# Patient Record
Sex: FEMALE | Race: WHITE | Employment: UNEMPLOYED | URBAN - METROPOLITAN AREA
[De-identification: names, ages, dates, MRNs, and addresses within clinical notes are randomized per-mention and may not be internally consistent; named-entity substitution may affect disease eponyms.]

---

## 2017-06-13 DIAGNOSIS — M25.561 RIGHT KNEE PAIN, UNSPECIFIED CHRONICITY: Primary | ICD-10-CM

## 2018-02-08 ENCOUNTER — HOSPITAL ENCOUNTER (OUTPATIENT)
Dept: GENERAL RADIOLOGY | Age: 38
Discharge: HOME | End: 2018-02-10
Payer: MEDICARE

## 2018-02-08 ENCOUNTER — OFFICE VISIT (OUTPATIENT)
Dept: FAMILY MEDICINE CLINIC | Age: 38
End: 2018-02-08
Payer: MEDICARE

## 2018-02-08 ENCOUNTER — HOSPITAL ENCOUNTER (OUTPATIENT)
Age: 38
Discharge: HOME | End: 2018-02-10
Payer: MEDICARE

## 2018-02-08 VITALS
WEIGHT: 194 LBS | SYSTOLIC BLOOD PRESSURE: 102 MMHG | TEMPERATURE: 96.8 F | BODY MASS INDEX: 32.32 KG/M2 | HEART RATE: 65 BPM | HEIGHT: 65 IN | DIASTOLIC BLOOD PRESSURE: 67 MMHG

## 2018-02-08 DIAGNOSIS — G89.29 CHRONIC PAIN OF BOTH KNEES: Primary | ICD-10-CM

## 2018-02-08 DIAGNOSIS — R52 PAIN: ICD-10-CM

## 2018-02-08 DIAGNOSIS — M54.50 CHRONIC MIDLINE LOW BACK PAIN WITHOUT SCIATICA: ICD-10-CM

## 2018-02-08 DIAGNOSIS — G89.29 CHRONIC MIDLINE LOW BACK PAIN WITHOUT SCIATICA: ICD-10-CM

## 2018-02-08 DIAGNOSIS — G89.29 CHRONIC PAIN OF BOTH KNEES: ICD-10-CM

## 2018-02-08 DIAGNOSIS — M25.561 CHRONIC PAIN OF BOTH KNEES: Primary | ICD-10-CM

## 2018-02-08 DIAGNOSIS — M25.561 CHRONIC PAIN OF BOTH KNEES: ICD-10-CM

## 2018-02-08 DIAGNOSIS — M25.562 CHRONIC PAIN OF BOTH KNEES: Primary | ICD-10-CM

## 2018-02-08 DIAGNOSIS — M25.562 CHRONIC PAIN OF BOTH KNEES: ICD-10-CM

## 2018-02-08 PROCEDURE — G8484 FLU IMMUNIZE NO ADMIN: HCPCS | Performed by: STUDENT IN AN ORGANIZED HEALTH CARE EDUCATION/TRAINING PROGRAM

## 2018-02-08 PROCEDURE — 73560 X-RAY EXAM OF KNEE 1 OR 2: CPT

## 2018-02-08 PROCEDURE — 4004F PT TOBACCO SCREEN RCVD TLK: CPT | Performed by: STUDENT IN AN ORGANIZED HEALTH CARE EDUCATION/TRAINING PROGRAM

## 2018-02-08 PROCEDURE — G8417 CALC BMI ABV UP PARAM F/U: HCPCS | Performed by: STUDENT IN AN ORGANIZED HEALTH CARE EDUCATION/TRAINING PROGRAM

## 2018-02-08 PROCEDURE — G8427 DOCREV CUR MEDS BY ELIG CLIN: HCPCS | Performed by: STUDENT IN AN ORGANIZED HEALTH CARE EDUCATION/TRAINING PROGRAM

## 2018-02-08 PROCEDURE — 99203 OFFICE O/P NEW LOW 30 MIN: CPT | Performed by: STUDENT IN AN ORGANIZED HEALTH CARE EDUCATION/TRAINING PROGRAM

## 2018-02-08 PROCEDURE — 72110 X-RAY EXAM L-2 SPINE 4/>VWS: CPT

## 2018-02-08 RX ORDER — NAPROXEN 500 MG/1
500 TABLET ORAL 2 TIMES DAILY WITH MEALS
Qty: 60 TABLET | Refills: 1 | Status: SHIPPED | OUTPATIENT
Start: 2018-02-08 | End: 2018-11-10

## 2018-02-08 ASSESSMENT — ENCOUNTER SYMPTOMS
COUGH: 0
BACK PAIN: 1
EYE PAIN: 0
RHINORRHEA: 0
WHEEZING: 0
NAUSEA: 0
PHOTOPHOBIA: 0
ABDOMINAL PAIN: 0
SHORTNESS OF BREATH: 0
VOMITING: 0
SORE THROAT: 0

## 2018-04-19 DIAGNOSIS — M25.562 PAIN IN BOTH KNEES, UNSPECIFIED CHRONICITY: Primary | ICD-10-CM

## 2018-04-19 DIAGNOSIS — M25.561 PAIN IN BOTH KNEES, UNSPECIFIED CHRONICITY: Primary | ICD-10-CM

## 2018-08-13 ENCOUNTER — HOSPITAL ENCOUNTER (OUTPATIENT)
Age: 38
Setting detail: SPECIMEN
Discharge: HOME | End: 2018-08-13
Payer: MEDICARE

## 2018-08-13 LAB
ABSOLUTE EOS #: 0.06 K/UL (ref 0–0.44)
ABSOLUTE IMMATURE GRANULOCYTE: <0.03 K/UL (ref 0–0.3)
ABSOLUTE LYMPH #: 2.2 K/UL (ref 1.1–3.7)
ABSOLUTE MONO #: 0.39 K/UL (ref 0.1–1.2)
ALBUMIN SERPL-MCNC: 4 G/DL (ref 3.5–5.2)
ALBUMIN/GLOBULIN RATIO: 1.3 (ref 1–2.5)
ALP BLD-CCNC: 76 U/L (ref 35–104)
ALT SERPL-CCNC: 13 U/L (ref 5–33)
ANION GAP SERPL CALCULATED.3IONS-SCNC: 17 MMOL/L (ref 9–17)
AST SERPL-CCNC: 13 U/L
BASOPHILS # BLD: 1 % (ref 0–2)
BASOPHILS ABSOLUTE: 0.04 K/UL (ref 0–0.2)
BILIRUB SERPL-MCNC: 0.54 MG/DL (ref 0.3–1.2)
BILIRUBIN DIRECT: 0.1 MG/DL
BILIRUBIN, INDIRECT: 0.44 MG/DL (ref 0–1)
BUN BLDV-MCNC: 18 MG/DL (ref 6–20)
CALCIUM SERPL-MCNC: 9.4 MG/DL (ref 8.6–10.4)
CHLORIDE BLD-SCNC: 97 MMOL/L (ref 98–107)
CHOLESTEROL/HDL RATIO: 2.9
CHOLESTEROL: 189 MG/DL
CO2: 26 MMOL/L (ref 20–31)
CREAT SERPL-MCNC: 0.68 MG/DL (ref 0.5–0.9)
DIFFERENTIAL TYPE: NORMAL
EOSINOPHILS RELATIVE PERCENT: 1 % (ref 1–4)
GFR AFRICAN AMERICAN: >60 ML/MIN
GFR NON-AFRICAN AMERICAN: >60 ML/MIN
GFR SERPL CREATININE-BSD FRML MDRD: ABNORMAL ML/MIN/{1.73_M2}
GFR SERPL CREATININE-BSD FRML MDRD: ABNORMAL ML/MIN/{1.73_M2}
GLUCOSE BLD-MCNC: 108 MG/DL (ref 70–99)
HAV IGM SER IA-ACNC: NONREACTIVE
HCG QUALITATIVE: NEGATIVE
HCT VFR BLD CALC: 39.7 % (ref 36.3–47.1)
HDLC SERPL-MCNC: 65 MG/DL
HEMOGLOBIN: 12.5 G/DL (ref 11.9–15.1)
HEPATITIS B CORE IGM ANTIBODY: NONREACTIVE
HEPATITIS B SURFACE ANTIGEN: NONREACTIVE
HEPATITIS C ANTIBODY: NONREACTIVE
HIV AG/AB: NONREACTIVE
IMMATURE GRANULOCYTES: 0 %
LDL CHOLESTEROL: 108 MG/DL (ref 0–130)
LYMPHOCYTES # BLD: 37 % (ref 24–43)
MCH RBC QN AUTO: 28 PG (ref 25.2–33.5)
MCHC RBC AUTO-ENTMCNC: 31.5 G/DL (ref 28.4–34.8)
MCV RBC AUTO: 88.8 FL (ref 82.6–102.9)
MONOCYTES # BLD: 7 % (ref 3–12)
NRBC AUTOMATED: 0 PER 100 WBC
PDW BLD-RTO: 14.4 % (ref 11.8–14.4)
PLATELET # BLD: 312 K/UL (ref 138–453)
PLATELET ESTIMATE: NORMAL
PMV BLD AUTO: 9.7 FL (ref 8.1–13.5)
POTASSIUM SERPL-SCNC: 4 MMOL/L (ref 3.7–5.3)
RBC # BLD: 4.47 M/UL (ref 3.95–5.11)
RBC # BLD: NORMAL 10*6/UL
SEG NEUTROPHILS: 54 % (ref 36–65)
SEGMENTED NEUTROPHILS ABSOLUTE COUNT: 3.25 K/UL (ref 1.5–8.1)
SODIUM BLD-SCNC: 140 MMOL/L (ref 135–144)
T3 FREE: 2.63 PG/ML (ref 2.02–4.43)
THYROXINE, FREE: 1.35 NG/DL (ref 0.93–1.7)
TOTAL PROTEIN: 7.2 G/DL (ref 6.4–8.3)
TRIGL SERPL-MCNC: 78 MG/DL
TSH SERPL DL<=0.05 MIU/L-ACNC: 0.33 MIU/L (ref 0.3–5)
VLDLC SERPL CALC-MCNC: NORMAL MG/DL (ref 1–30)
WBC # BLD: 6 K/UL (ref 3.5–11.3)
WBC # BLD: NORMAL 10*3/UL

## 2018-08-15 LAB
QUANTI TB GOLD PLUS: NEGATIVE
QUANTI TB1 MINUS NIL: 0 IU/ML (ref 0–0.34)
QUANTI TB2 MINUS NIL: 0.01 IU/ML (ref 0–0.34)
QUANTIFERON MITOGEN: 4.96 IU/ML
QUANTIFERON NIL: 0.08 IU/ML

## 2018-11-10 ENCOUNTER — HOSPITAL ENCOUNTER (EMERGENCY)
Age: 38
Discharge: HOME | End: 2018-11-10
Attending: EMERGENCY MEDICINE
Payer: MEDICARE

## 2018-11-10 VITALS
DIASTOLIC BLOOD PRESSURE: 53 MMHG | WEIGHT: 180 LBS | OXYGEN SATURATION: 99 % | SYSTOLIC BLOOD PRESSURE: 124 MMHG | HEART RATE: 45 BPM | RESPIRATION RATE: 18 BRPM | TEMPERATURE: 98.6 F | HEIGHT: 65 IN | BODY MASS INDEX: 29.99 KG/M2

## 2018-11-10 DIAGNOSIS — F11.93 HEROIN WITHDRAWAL (HCC): Primary | ICD-10-CM

## 2018-11-10 LAB
ABSOLUTE EOS #: <0.03 K/UL (ref 0–0.44)
ABSOLUTE IMMATURE GRANULOCYTE: <0.03 K/UL (ref 0–0.3)
ABSOLUTE LYMPH #: 2.64 K/UL (ref 1.1–3.7)
ABSOLUTE MONO #: 0.65 K/UL (ref 0.1–1.2)
ANION GAP SERPL CALCULATED.3IONS-SCNC: 13 MMOL/L (ref 9–17)
BASOPHILS # BLD: 1 % (ref 0–2)
BASOPHILS ABSOLUTE: 0.04 K/UL (ref 0–0.2)
BUN BLDV-MCNC: 13 MG/DL (ref 6–20)
BUN/CREAT BLD: ABNORMAL (ref 9–20)
CALCIUM SERPL-MCNC: 9.7 MG/DL (ref 8.6–10.4)
CHLORIDE BLD-SCNC: 93 MMOL/L (ref 98–107)
CO2: 28 MMOL/L (ref 20–31)
CREAT SERPL-MCNC: 0.73 MG/DL (ref 0.5–0.9)
DIFFERENTIAL TYPE: ABNORMAL
EOSINOPHILS RELATIVE PERCENT: 0 % (ref 1–4)
GFR AFRICAN AMERICAN: >60 ML/MIN
GFR NON-AFRICAN AMERICAN: >60 ML/MIN
GFR SERPL CREATININE-BSD FRML MDRD: ABNORMAL ML/MIN/{1.73_M2}
GFR SERPL CREATININE-BSD FRML MDRD: ABNORMAL ML/MIN/{1.73_M2}
GLUCOSE BLD-MCNC: 105 MG/DL (ref 70–99)
HCG QUALITATIVE: NEGATIVE
HCT VFR BLD CALC: 43.5 % (ref 36.3–47.1)
HEMOGLOBIN: 14 G/DL (ref 11.9–15.1)
IMMATURE GRANULOCYTES: 0 %
LYMPHOCYTES # BLD: 35 % (ref 24–43)
MCH RBC QN AUTO: 26.9 PG (ref 25.2–33.5)
MCHC RBC AUTO-ENTMCNC: 32.2 G/DL (ref 28.4–34.8)
MCV RBC AUTO: 83.5 FL (ref 82.6–102.9)
MONOCYTES # BLD: 9 % (ref 3–12)
NRBC AUTOMATED: 0 PER 100 WBC
PDW BLD-RTO: 13.9 % (ref 11.8–14.4)
PLATELET # BLD: 341 K/UL (ref 138–453)
PLATELET ESTIMATE: ABNORMAL
PMV BLD AUTO: 9.3 FL (ref 8.1–13.5)
POTASSIUM SERPL-SCNC: 3.7 MMOL/L (ref 3.7–5.3)
RBC # BLD: 5.21 M/UL (ref 3.95–5.11)
RBC # BLD: ABNORMAL 10*6/UL
SEG NEUTROPHILS: 55 % (ref 36–65)
SEGMENTED NEUTROPHILS ABSOLUTE COUNT: 4.3 K/UL (ref 1.5–8.1)
SODIUM BLD-SCNC: 134 MMOL/L (ref 135–144)
WBC # BLD: 7.7 K/UL (ref 3.5–11.3)
WBC # BLD: ABNORMAL 10*3/UL

## 2018-11-10 PROCEDURE — 99283 EMERGENCY DEPT VISIT LOW MDM: CPT

## 2018-11-10 PROCEDURE — 6360000002 HC RX W HCPCS: Performed by: EMERGENCY MEDICINE

## 2018-11-10 PROCEDURE — 96372 THER/PROPH/DIAG INJ SC/IM: CPT

## 2018-11-10 PROCEDURE — 2580000003 HC RX 258: Performed by: EMERGENCY MEDICINE

## 2018-11-10 PROCEDURE — 84703 CHORIONIC GONADOTROPIN ASSAY: CPT

## 2018-11-10 PROCEDURE — 96375 TX/PRO/DX INJ NEW DRUG ADDON: CPT

## 2018-11-10 PROCEDURE — 96361 HYDRATE IV INFUSION ADD-ON: CPT

## 2018-11-10 PROCEDURE — 80048 BASIC METABOLIC PNL TOTAL CA: CPT

## 2018-11-10 PROCEDURE — 96374 THER/PROPH/DIAG INJ IV PUSH: CPT

## 2018-11-10 PROCEDURE — 85025 COMPLETE CBC W/AUTO DIFF WBC: CPT

## 2018-11-10 RX ORDER — KETOROLAC TROMETHAMINE 15 MG/ML
15 INJECTION, SOLUTION INTRAMUSCULAR; INTRAVENOUS ONCE
Status: COMPLETED | OUTPATIENT
Start: 2018-11-10 | End: 2018-11-10

## 2018-11-10 RX ORDER — 0.9 % SODIUM CHLORIDE 0.9 %
1000 INTRAVENOUS SOLUTION INTRAVENOUS ONCE
Status: COMPLETED | OUTPATIENT
Start: 2018-11-10 | End: 2018-11-10

## 2018-11-10 RX ORDER — PROMETHAZINE HYDROCHLORIDE 25 MG/1
25 TABLET ORAL EVERY 6 HOURS PRN
Qty: 20 TABLET | Refills: 0 | Status: SHIPPED | OUTPATIENT
Start: 2018-11-10 | End: 2018-11-17

## 2018-11-10 RX ORDER — LORAZEPAM 2 MG/ML
0.5 INJECTION INTRAMUSCULAR ONCE
Status: COMPLETED | OUTPATIENT
Start: 2018-11-10 | End: 2018-11-10

## 2018-11-10 RX ORDER — HYDROXYZINE HYDROCHLORIDE 50 MG/ML
50 INJECTION, SOLUTION INTRAMUSCULAR ONCE
Status: COMPLETED | OUTPATIENT
Start: 2018-11-10 | End: 2018-11-10

## 2018-11-10 RX ORDER — ONDANSETRON 2 MG/ML
4 INJECTION INTRAMUSCULAR; INTRAVENOUS ONCE
Status: COMPLETED | OUTPATIENT
Start: 2018-11-10 | End: 2018-11-10

## 2018-11-10 RX ADMIN — HYDROXYZINE HYDROCHLORIDE 50 MG: 50 INJECTION, SOLUTION INTRAMUSCULAR at 05:50

## 2018-11-10 RX ADMIN — SODIUM CHLORIDE 1000 ML: 9 INJECTION, SOLUTION INTRAVENOUS at 06:20

## 2018-11-10 RX ADMIN — KETOROLAC TROMETHAMINE 15 MG: 15 INJECTION INTRAMUSCULAR; INTRAVENOUS at 05:50

## 2018-11-10 RX ADMIN — LORAZEPAM 0.5 MG: 2 INJECTION INTRAMUSCULAR; INTRAVENOUS at 07:03

## 2018-11-10 RX ADMIN — ONDANSETRON 4 MG: 2 INJECTION INTRAMUSCULAR; INTRAVENOUS at 05:50

## 2018-11-10 RX ADMIN — SODIUM CHLORIDE 1000 ML: 9 INJECTION, SOLUTION INTRAVENOUS at 05:50

## 2018-11-10 ASSESSMENT — PAIN DESCRIPTION - DESCRIPTORS: DESCRIPTORS: ACHING

## 2018-11-10 ASSESSMENT — PAIN DESCRIPTION - PAIN TYPE: TYPE: ACUTE PAIN

## 2018-11-10 ASSESSMENT — PAIN SCALES - GENERAL: PAINLEVEL_OUTOF10: 10

## 2018-11-10 NOTE — ED PROVIDER NOTES
[facility] [facility]     Emergency Department     Faculty Attestation    I performed a history and physical examination of the patient and discussed management with the resident. I reviewed the residents note and agree with the documented findings and plan of care. Any areas of disagreement are noted on the chart. I was personally present for the key portions of any procedures. I have documented in the chart those procedures where I was not present during the key portions. I have reviewed the emergency nurses triage note. I agree with the chief complaint, past medical history, past surgical history, allergies, medications, social and family history as documented unless otherwise noted below. For Physician Assistant/ Nurse Practitioner cases/documentation I have personally evaluated this patient and have completed at least one if not all key elements of the E/M (history, physical exam, and MDM). Additional findings are as noted. Patient here with vomiting diarrhea crampy bowel pain for the last 4 days. Patient entered rehab for heroin abuse the day before, is on Subutex, states cannot sleep as well. No headache. AB and soft no focal tenderness rebound or guarding.   We'll treat symptoms, plan to discharge      Critical Care     None    Alexsander Castrejon MD, Zain Serna  Attending Emergency  Physician             Alexsander Castrejon MD  11/10/18 4891

## 2018-11-10 NOTE — ED PROVIDER NOTES
Panel    Urinalysis Reflex to Culture    HCG Qualitative, Serum    Insert peripheral IV       MEDICATIONS ORDERED:  Orders Placed This Encounter   Medications    0.9 % sodium chloride bolus    ondansetron (ZOFRAN) injection 4 mg    ketorolac (TORADOL) injection 15 mg    hydrOXYzine (VISTARIL) injection 50 mg    0.9 % sodium chloride bolus    LORazepam (ATIVAN) injection 0.5 mg    promethazine (PHENERGAN) 25 MG tablet     Sig: Take 1 tablet by mouth every 6 hours as needed for Nausea     Dispense:  20 tablet     Refill:  0       DDX: withdrawal symptoms, dehydration, DKA    DIAGNOSTIC RESULTS / EMERGENCY DEPARTMENT COURSE / MDM     LABS:  Results for orders placed or performed during the hospital encounter of 11/10/18   CBC WITH AUTO DIFFERENTIAL   Result Value Ref Range    WBC 7.7 3.5 - 11.3 k/uL    RBC 5.21 (H) 3.95 - 5.11 m/uL    Hemoglobin 14.0 11.9 - 15.1 g/dL    Hematocrit 43.5 36.3 - 47.1 %    MCV 83.5 82.6 - 102.9 fL    MCH 26.9 25.2 - 33.5 pg    MCHC 32.2 28.4 - 34.8 g/dL    RDW 13.9 11.8 - 14.4 %    Platelets 780 666 - 856 k/uL    MPV 9.3 8.1 - 13.5 fL    NRBC Automated 0.0 0.0 per 100 WBC    Differential Type NOT REPORTED     Seg Neutrophils 55 36 - 65 %    Lymphocytes 35 24 - 43 %    Monocytes 9 3 - 12 %    Eosinophils % 0 (L) 1 - 4 %    Basophils 1 0 - 2 %    Immature Granulocytes 0 0 %    Segs Absolute 4.30 1.50 - 8.10 k/uL    Absolute Lymph # 2.64 1.10 - 3.70 k/uL    Absolute Mono # 0.65 0.10 - 1.20 k/uL    Absolute Eos # <0.03 0.00 - 0.44 k/uL    Basophils # 0.04 0.00 - 0.20 k/uL    Absolute Immature Granulocyte <0.03 0.00 - 0.30 k/uL    WBC Morphology NOT REPORTED     RBC Morphology NOT REPORTED     Platelet Estimate NOT REPORTED    Basic Metabolic Panel   Result Value Ref Range    Glucose 105 (H) 70 - 99 mg/dL    BUN 13 6 - 20 mg/dL    CREATININE 0.73 0.50 - 0.90 mg/dL    Bun/Cre Ratio NOT REPORTED 9 - 20    Calcium 9.7 8.6 - 10.4 mg/dL    Sodium 134 (L) 135 - 144 mmol/L    Potassium 3.7 3.7 - 5.3 mmol/L    Chloride 93 (L) 98 - 107 mmol/L    CO2 28 20 - 31 mmol/L    Anion Gap 13 9 - 17 mmol/L    GFR Non-African American >60 >60 mL/min    GFR African American >60 >60 mL/min    GFR Comment          GFR Staging NOT REPORTED    HCG Qualitative, Serum   Result Value Ref Range    hCG Qual NEGATIVE NEG       IMPRESSION: 65-year-old female presents with symptoms of generalized malaise, body aches, vomiting. Likely going through acute withdrawal.  Last time she used heroin was Monday. In a program and on Subutex. Patient slightly bradycardic however appears well-hydrated. She does no vomiting. Abdomen soft, nondistended with minimal tenderness. Absolutely no peritoneal.  We'll rehydrate, check labs, symptomatically control and likely discharged to follow-up with [facility] RADIOLOGY:  None    EKG  None    All EKG's are interpreted by the Emergency Department Physician who either signs or Co-signs this chart in the absence of a cardiologist.    EMERGENCY DEPARTMENT COURSE:  Patient feels improved. Vital signs remain normal.  Well-hydrated after 2 L of fluid. We'll discharge home to continue treatment. PROCEDURES:  None    CONSULTS:  None    CRITICAL CARE:  None    FINAL IMPRESSION      1. Heroin withdrawal ([address]          DISPOSITION / PLAN     DISPOSITION  Discharge      PATIENT REFERRED TO:  No follow-up provider specified. DISCHARGE MEDICATIONS:  Discharge Medication List as of 11/10/2018  7:36 AM      START taking these medications    Details   promethazine (PHENERGAN) 25 MG tablet Take 1 tablet by mouth every 6 hours as needed for Nausea, Disp-20 tablet, R-0Print             Kobe Fox DO  Emergency Medicine Resident    (Please note that portions of thisnote were completed with a voice recognition program.  Efforts were made to edit the dictations but occasionally words are mis-transcribed. )        Kobe Fox DO  Resident  11/10/18 [address] [place]

## 2018-11-15 ASSESSMENT — ENCOUNTER SYMPTOMS
CHEST TIGHTNESS: 0
VOMITING: 1
SHORTNESS OF BREATH: 0
ABDOMINAL PAIN: 0
NAUSEA: 1
WHEEZING: 0

## 2018-12-13 ENCOUNTER — HOSPITAL ENCOUNTER (EMERGENCY)
Age: 38
Discharge: HOME | End: 2018-12-13
Attending: EMERGENCY MEDICINE
Payer: MEDICARE

## 2018-12-13 VITALS
SYSTOLIC BLOOD PRESSURE: 124 MMHG | DIASTOLIC BLOOD PRESSURE: 82 MMHG | HEART RATE: 96 BPM | OXYGEN SATURATION: 100 % | TEMPERATURE: 98.2 F | RESPIRATION RATE: 18 BRPM

## 2018-12-13 DIAGNOSIS — T50.904A DRUG OVERDOSE, UNDETERMINED INTENT, INITIAL ENCOUNTER: Primary | ICD-10-CM

## 2018-12-13 PROCEDURE — G0383 LEV 4 HOSP TYPE B ED VISIT: HCPCS

## 2018-12-13 ASSESSMENT — ENCOUNTER SYMPTOMS
DIARRHEA: 0
VOMITING: 0
ABDOMINAL PAIN: 0
SORE THROAT: 0
BACK PAIN: 0
SHORTNESS OF BREATH: 0

## 2018-12-13 ASSESSMENT — PAIN SCALES - GENERAL: PAINLEVEL_OUTOF10: 7

## 2018-12-13 ASSESSMENT — PAIN DESCRIPTION - ORIENTATION: ORIENTATION: LOWER

## 2018-12-13 ASSESSMENT — PAIN DESCRIPTION - LOCATION: LOCATION: BACK

## 2018-12-13 NOTE — ED PROVIDER NOTES
[facility] ED  eMERGENCY dEPARTMENT eNCOUnter   Attending Attestation     Pt Name: Glenna Jackson  MRN: [id number]  [place] 1980  Date of evaluation: 12/13/18       Glenna Jackson is a [address] [facility] y.o. female who presents with Drug Overdose (found in [facility] bathroom unresponsive, 4mg narcan IN, responsive )      History: Patient lives with her overdose. Patient followed at [facility] bathroom after having used heroin. Exam: Heart rate and rhythm are regular abdomen exam.  Lungs are clear to auscultation bilaterally. Abdomen soft, nontender. Patient's well-appearing. Patient states she is feeling tired. We'll let her rest and reevaluate in 2 hours. Plan for discharge. I performed a history and physical examination of the patient and discussed management with the resident. I reviewed the residents note and agree with the documented findings and plan of care. Any areas of disagreement are noted on the chart. I was personally present for the key portions of any procedures. I have documented in the chart those procedures where I was not present during the key portions. I have personally reviewed all images and agree with the resident's interpretation. I have reviewed the emergency nurses triage note. I agree with the chief complaint, past medical history, past surgical history, allergies, medications, social and family history as documented unless otherwise noted below. Documentation of the HPI, Physical Exam and Medical Decision Making performed by medical students or scribes is based on my personal performance of the HPI, PE and MDM. For Phys Assistant/ Nurse Practitioner cases/documentation I have had a face to face evaluation of this patient and have completed at least one if not all key elements of the E/M (history, physical exam, and MDM). Additional findings are as noted.     For APC cases I have personally evaluated and examined the patient in conjunction with the APC and agree with the treatment plan and disposition of the patient as recorded by the APC.     Edward Dunlap MD  Attending Emergency  Physician        Juliet Garcia MD  12/13/18 7370

## 2018-12-13 NOTE — ED PROVIDER NOTES
Constitutional: Positive for fatigue. Negative for chills and fever. HENT: Negative for dental problem and sore throat. Eyes: Negative for visual disturbance. Respiratory: Negative for shortness of breath. Cardiovascular: Negative for chest pain. Gastrointestinal: Negative for abdominal pain, diarrhea and vomiting. Genitourinary: Negative for hematuria. Musculoskeletal: Negative for back pain and neck pain. Skin: Negative for rash. Neurological: Positive for syncope. Negative for headaches. Psychiatric/Behavioral: Negative for confusion. PHYSICAL EXAM   (up to 7 for level 4, 8 or more for level 5)      INITIAL VITALS:   /82   Pulse 96   Temp 98.2 °F (36.8 °C) (Oral)   Resp 18   LMP 12/06/2018   SpO2 100%     Physical Exam   Constitutional: She is oriented to person, place, and time. She appears well-developed and well-nourished. No distress. Fatigued   HENT:   Head: Normocephalic and atraumatic. Mouth/Throat: Oropharynx is clear and moist.   No raccoon eyes, no hilton sign, no hemotympanum   Eyes: Pupils are equal, round, and reactive to light. EOM are normal.   Pupils are 4 and reactive bilaterally   Neck: Normal range of motion. Neck supple. Cardiovascular: Normal rate, regular rhythm, normal heart sounds and intact distal pulses. Pulmonary/Chest: Effort normal and breath sounds normal.   Abdominal: Soft. There is no tenderness. Musculoskeletal: Normal range of motion. She exhibits no deformity. No cervical thoracic or lumbar midline tenderness   Neurological: She is alert and oriented to person, place, and time. She has normal strength. No cranial nerve deficit or sensory deficit. GCS eye subscore is 4. GCS verbal subscore is 5. GCS motor subscore is 6. Skin: Skin is warm and dry. Capillary refill takes less than 2 seconds. She is not diaphoretic. Psychiatric: Thought content normal.   Nursing note and vitals reviewed.       DIFFERENTIAL  DIAGNOSIS     PLAN

## 2018-12-29 ENCOUNTER — HOSPITAL ENCOUNTER (EMERGENCY)
Age: 38
Discharge: HOME | End: 2018-12-30
Attending: EMERGENCY MEDICINE
Payer: MEDICARE

## 2018-12-29 ENCOUNTER — APPOINTMENT (OUTPATIENT)
Dept: GENERAL RADIOLOGY | Age: 38
End: 2018-12-29
Payer: MEDICARE

## 2018-12-29 VITALS
RESPIRATION RATE: 18 BRPM | SYSTOLIC BLOOD PRESSURE: 139 MMHG | TEMPERATURE: 98.4 F | DIASTOLIC BLOOD PRESSURE: 105 MMHG | HEART RATE: 93 BPM | OXYGEN SATURATION: 98 %

## 2018-12-29 DIAGNOSIS — R11.2 NON-INTRACTABLE VOMITING WITH NAUSEA, UNSPECIFIED VOMITING TYPE: Primary | ICD-10-CM

## 2018-12-29 LAB
-: NORMAL
AMORPHOUS: NORMAL
BACTERIA: NORMAL
BILIRUBIN URINE: NEGATIVE
CASTS UA: NORMAL /LPF (ref 0–8)
COLOR: ABNORMAL
COMMENT UA: ABNORMAL
CRYSTALS, UA: NORMAL /HPF
EKG ATRIAL RATE: 89 BPM
EKG P AXIS: 46 DEGREES
EKG P-R INTERVAL: 136 MS
EKG Q-T INTERVAL: 342 MS
EKG QRS DURATION: 76 MS
EKG QTC CALCULATION (BAZETT): 416 MS
EKG R AXIS: 60 DEGREES
EKG T AXIS: 45 DEGREES
EKG VENTRICULAR RATE: 89 BPM
EPITHELIAL CELLS UA: NORMAL /HPF (ref 0–5)
GLUCOSE URINE: NEGATIVE
HCG(URINE) PREGNANCY TEST: NEGATIVE
KETONES, URINE: NEGATIVE
LEUKOCYTE ESTERASE, URINE: NEGATIVE
MUCUS: NORMAL
NITRITE, URINE: NEGATIVE
OTHER OBSERVATIONS UA: NORMAL
PH UA: 6 (ref 5–8)
PROTEIN UA: ABNORMAL
RBC UA: NORMAL /HPF (ref 0–4)
RENAL EPITHELIAL, UA: NORMAL /HPF
SPECIFIC GRAVITY UA: 1.03 (ref 1–1.03)
TRICHOMONAS: NORMAL
TURBIDITY: CLEAR
URINE HGB: ABNORMAL
UROBILINOGEN, URINE: NORMAL
WBC UA: NORMAL /HPF (ref 0–5)
YEAST: NORMAL

## 2018-12-29 PROCEDURE — 6360000002 HC RX W HCPCS: Performed by: STUDENT IN AN ORGANIZED HEALTH CARE EDUCATION/TRAINING PROGRAM

## 2018-12-29 PROCEDURE — 6370000000 HC RX 637 (ALT 250 FOR IP): Performed by: STUDENT IN AN ORGANIZED HEALTH CARE EDUCATION/TRAINING PROGRAM

## 2018-12-29 PROCEDURE — 99284 EMERGENCY DEPT VISIT MOD MDM: CPT

## 2018-12-29 PROCEDURE — 84703 CHORIONIC GONADOTROPIN ASSAY: CPT

## 2018-12-29 PROCEDURE — 93005 ELECTROCARDIOGRAM TRACING: CPT

## 2018-12-29 PROCEDURE — 87086 URINE CULTURE/COLONY COUNT: CPT

## 2018-12-29 PROCEDURE — 81001 URINALYSIS AUTO W/SCOPE: CPT

## 2018-12-29 RX ORDER — ONDANSETRON 4 MG/1
4 TABLET, FILM COATED ORAL ONCE
Status: COMPLETED | OUTPATIENT
Start: 2018-12-29 | End: 2018-12-29

## 2018-12-29 RX ORDER — LORAZEPAM 0.5 MG/1
0.5 TABLET ORAL ONCE
Status: COMPLETED | OUTPATIENT
Start: 2018-12-29 | End: 2018-12-29

## 2018-12-29 RX ORDER — ONDANSETRON 4 MG/1
4 TABLET, FILM COATED ORAL EVERY 8 HOURS PRN
Qty: 9 TABLET | Refills: 0 | Status: SHIPPED | OUTPATIENT
Start: 2018-12-29 | End: 2019-01-01

## 2018-12-29 RX ORDER — BUPRENORPHINE AND NALOXONE 4; 1 MG/1; MG/1
1 FILM, SOLUBLE BUCCAL; SUBLINGUAL DAILY
COMMUNITY

## 2018-12-29 RX ADMIN — ONDANSETRON HYDROCHLORIDE 4 MG: 4 TABLET, FILM COATED ORAL at 23:32

## 2018-12-29 RX ADMIN — LORAZEPAM 0.5 MG: 0.5 TABLET ORAL at 22:37

## 2018-12-29 ASSESSMENT — ENCOUNTER SYMPTOMS
DIARRHEA: 0
ABDOMINAL PAIN: 0
NAUSEA: 1
SORE THROAT: 0
VOMITING: 1
CHEST TIGHTNESS: 0
SHORTNESS OF BREATH: 1

## 2018-12-29 ASSESSMENT — PAIN DESCRIPTION - DESCRIPTORS: DESCRIPTORS: ACHING

## 2018-12-29 ASSESSMENT — PAIN DESCRIPTION - PAIN TYPE: TYPE: ACUTE PAIN

## 2018-12-29 ASSESSMENT — PAIN SCALES - GENERAL: PAINLEVEL_OUTOF10: 8

## 2018-12-29 ASSESSMENT — PAIN DESCRIPTION - LOCATION: LOCATION: BACK

## 2018-12-29 ASSESSMENT — PAIN DESCRIPTION - ORIENTATION: ORIENTATION: MID;LOWER

## 2018-12-31 LAB
CULTURE: NO GROWTH
Lab: NORMAL
SPECIMEN DESCRIPTION: NORMAL
STATUS: NORMAL

## 2020-01-24 ENCOUNTER — HOSPITAL ENCOUNTER (OUTPATIENT)
Age: 40
Discharge: HOME | End: 2020-01-24
Payer: MEDICARE

## 2020-01-24 LAB
ALBUMIN SERPL-MCNC: 3.8 G/DL (ref 3.5–5.2)
ALBUMIN/GLOBULIN RATIO: 1.2 (ref 1–2.5)
ALP BLD-CCNC: 97 U/L (ref 35–104)
ALT SERPL-CCNC: 10 U/L (ref 5–33)
ANION GAP SERPL CALCULATED.3IONS-SCNC: 14 MMOL/L (ref 9–17)
AST SERPL-CCNC: 12 U/L
BILIRUB SERPL-MCNC: 0.55 MG/DL (ref 0.3–1.2)
BUN BLDV-MCNC: 12 MG/DL (ref 6–20)
BUN/CREAT BLD: NORMAL (ref 9–20)
CALCIUM SERPL-MCNC: 8.9 MG/DL (ref 8.6–10.4)
CHLORIDE BLD-SCNC: 101 MMOL/L (ref 98–107)
CO2: 23 MMOL/L (ref 20–31)
CREAT SERPL-MCNC: 0.56 MG/DL (ref 0.5–0.9)
GFR AFRICAN AMERICAN: >60 ML/MIN
GFR NON-AFRICAN AMERICAN: >60 ML/MIN
GFR SERPL CREATININE-BSD FRML MDRD: NORMAL ML/MIN/{1.73_M2}
GFR SERPL CREATININE-BSD FRML MDRD: NORMAL ML/MIN/{1.73_M2}
GLUCOSE BLD-MCNC: 91 MG/DL (ref 70–99)
HAV IGM SER IA-ACNC: NONREACTIVE
HCT VFR BLD CALC: 38 % (ref 36.3–47.1)
HEMOGLOBIN: 12.4 G/DL (ref 11.9–15.1)
HEPATITIS B CORE IGM ANTIBODY: NONREACTIVE
HEPATITIS B SURFACE ANTIGEN: NONREACTIVE
HEPATITIS C ANTIBODY: NONREACTIVE
HIV AG/AB: NONREACTIVE
MCH RBC QN AUTO: 28.6 PG (ref 25.2–33.5)
MCHC RBC AUTO-ENTMCNC: 32.6 G/DL (ref 28.4–34.8)
MCV RBC AUTO: 87.8 FL (ref 82.6–102.9)
NRBC AUTOMATED: 0 PER 100 WBC
PDW BLD-RTO: 14.3 % (ref 11.8–14.4)
PLATELET # BLD: 295 K/UL (ref 138–453)
PMV BLD AUTO: 9.5 FL (ref 8.1–13.5)
POTASSIUM SERPL-SCNC: 4.2 MMOL/L (ref 3.7–5.3)
RBC # BLD: 4.33 M/UL (ref 3.95–5.11)
SODIUM BLD-SCNC: 138 MMOL/L (ref 135–144)
T. PALLIDUM, IGG: NONREACTIVE
TOTAL PROTEIN: 7 G/DL (ref 6.4–8.3)
WBC # BLD: 7.8 K/UL (ref 3.5–11.3)

## 2020-01-24 PROCEDURE — 36415 COLL VENOUS BLD VENIPUNCTURE: CPT

## 2020-01-24 PROCEDURE — 87389 HIV-1 AG W/HIV-1&-2 AB AG IA: CPT

## 2020-01-24 PROCEDURE — 80053 COMPREHEN METABOLIC PANEL: CPT

## 2020-01-24 PROCEDURE — 80074 ACUTE HEPATITIS PANEL: CPT

## 2020-01-24 PROCEDURE — 85027 COMPLETE CBC AUTOMATED: CPT

## 2020-01-24 PROCEDURE — 86780 TREPONEMA PALLIDUM: CPT

## 2020-05-11 ENCOUNTER — HOSPITAL ENCOUNTER (EMERGENCY)
Age: 40
Discharge: HOME | End: 2020-05-12
Attending: EMERGENCY MEDICINE
Payer: MEDICARE

## 2020-05-11 ENCOUNTER — APPOINTMENT (OUTPATIENT)
Dept: CT IMAGING | Age: 40
End: 2020-05-11
Payer: MEDICARE

## 2020-05-11 LAB
-: NORMAL
ABSOLUTE EOS #: 0.12 K/UL (ref 0–0.44)
ABSOLUTE IMMATURE GRANULOCYTE: <0.03 K/UL (ref 0–0.3)
ABSOLUTE LYMPH #: 2.13 K/UL (ref 1.1–3.7)
ABSOLUTE MONO #: 0.43 K/UL (ref 0.1–1.2)
ACETAMINOPHEN LEVEL: <5 UG/ML (ref 10–30)
ALBUMIN SERPL-MCNC: 3.7 G/DL (ref 3.5–5.2)
ALBUMIN/GLOBULIN RATIO: 1.2 (ref 1–2.5)
ALP BLD-CCNC: 97 U/L (ref 35–104)
ALT SERPL-CCNC: 13 U/L (ref 5–33)
AMMONIA: 22 UMOL/L (ref 11–51)
AMORPHOUS: NORMAL
AMPHETAMINE SCREEN URINE: NEGATIVE
ANION GAP SERPL CALCULATED.3IONS-SCNC: 12 MMOL/L (ref 9–17)
AST SERPL-CCNC: 13 U/L
BACTERIA: NORMAL
BARBITURATE SCREEN URINE: NEGATIVE
BASOPHILS # BLD: 1 % (ref 0–2)
BASOPHILS ABSOLUTE: 0.05 K/UL (ref 0–0.2)
BENZODIAZEPINE SCREEN, URINE: POSITIVE
BILIRUB SERPL-MCNC: 0.21 MG/DL (ref 0.3–1.2)
BILIRUBIN URINE: NEGATIVE
BUN BLDV-MCNC: 12 MG/DL (ref 6–20)
BUN/CREAT BLD: ABNORMAL (ref 9–20)
BUPRENORPHINE URINE: ABNORMAL
CALCIUM SERPL-MCNC: 9 MG/DL (ref 8.6–10.4)
CANNABINOID SCREEN URINE: NEGATIVE
CASTS UA: NORMAL /LPF (ref 0–8)
CHLORIDE BLD-SCNC: 105 MMOL/L (ref 98–107)
CO2: 22 MMOL/L (ref 20–31)
COCAINE METABOLITE, URINE: NEGATIVE
COLOR: YELLOW
COMMENT UA: ABNORMAL
CREAT SERPL-MCNC: 0.61 MG/DL (ref 0.5–0.9)
CRYSTALS, UA: NORMAL /HPF
DIFFERENTIAL TYPE: ABNORMAL
EOSINOPHILS RELATIVE PERCENT: 2 % (ref 1–4)
EPITHELIAL CELLS UA: NORMAL /HPF (ref 0–5)
ETHANOL PERCENT: <0.01 %
ETHANOL: <10 MG/DL
GFR AFRICAN AMERICAN: >60 ML/MIN
GFR NON-AFRICAN AMERICAN: >60 ML/MIN
GFR SERPL CREATININE-BSD FRML MDRD: ABNORMAL ML/MIN/{1.73_M2}
GFR SERPL CREATININE-BSD FRML MDRD: ABNORMAL ML/MIN/{1.73_M2}
GLUCOSE BLD-MCNC: 111 MG/DL (ref 70–99)
GLUCOSE URINE: NEGATIVE
HCG QUALITATIVE: NEGATIVE
HCT VFR BLD CALC: 39 % (ref 36.3–47.1)
HEMOGLOBIN: 12.7 G/DL (ref 11.9–15.1)
IMMATURE GRANULOCYTES: 0 %
KETONES, URINE: NEGATIVE
LEUKOCYTE ESTERASE, URINE: ABNORMAL
LYMPHOCYTES # BLD: 35 % (ref 24–43)
MCH RBC QN AUTO: 29.5 PG (ref 25.2–33.5)
MCHC RBC AUTO-ENTMCNC: 32.6 G/DL (ref 28.4–34.8)
MCV RBC AUTO: 90.7 FL (ref 82.6–102.9)
MDMA URINE: ABNORMAL
METHADONE SCREEN, URINE: NEGATIVE
METHAMPHETAMINE, URINE: ABNORMAL
MONOCYTES # BLD: 7 % (ref 3–12)
MUCUS: NORMAL
NITRITE, URINE: NEGATIVE
NRBC AUTOMATED: 0 PER 100 WBC
OPIATES, URINE: POSITIVE
OTHER OBSERVATIONS UA: NORMAL
OXYCODONE SCREEN URINE: NEGATIVE
PDW BLD-RTO: 15.2 % (ref 11.8–14.4)
PH UA: 5.5 (ref 5–8)
PHENCYCLIDINE, URINE: NEGATIVE
PLATELET # BLD: 284 K/UL (ref 138–453)
PLATELET ESTIMATE: ABNORMAL
PMV BLD AUTO: 9.5 FL (ref 8.1–13.5)
POTASSIUM SERPL-SCNC: 3.9 MMOL/L (ref 3.7–5.3)
PROPOXYPHENE, URINE: ABNORMAL
PROTEIN UA: NEGATIVE
RBC # BLD: 4.3 M/UL (ref 3.95–5.11)
RBC # BLD: ABNORMAL 10*6/UL
RBC UA: NORMAL /HPF (ref 0–4)
RENAL EPITHELIAL, UA: NORMAL /HPF
SALICYLATE LEVEL: <1 MG/DL (ref 3–10)
SEG NEUTROPHILS: 55 % (ref 36–65)
SEGMENTED NEUTROPHILS ABSOLUTE COUNT: 3.33 K/UL (ref 1.5–8.1)
SODIUM BLD-SCNC: 139 MMOL/L (ref 135–144)
SPECIFIC GRAVITY UA: 1.01 (ref 1–1.03)
TEST INFORMATION: ABNORMAL
TOTAL PROTEIN: 6.9 G/DL (ref 6.4–8.3)
TOXIC TRICYCLIC SC,BLOOD: NEGATIVE
TRICHOMONAS: NORMAL
TRICYCLIC ANTIDEPRESSANTS, UR: ABNORMAL
TROPONIN INTERP: NORMAL
TROPONIN T: NORMAL NG/ML
TROPONIN, HIGH SENSITIVITY: <6 NG/L (ref 0–14)
TURBIDITY: ABNORMAL
URINE HGB: ABNORMAL
UROBILINOGEN, URINE: NORMAL
WBC # BLD: 6.1 K/UL (ref 3.5–11.3)
WBC # BLD: ABNORMAL 10*3/UL
WBC UA: NORMAL /HPF (ref 0–5)
YEAST: NORMAL

## 2020-05-11 PROCEDURE — 87086 URINE CULTURE/COLONY COUNT: CPT

## 2020-05-11 PROCEDURE — 70450 CT HEAD/BRAIN W/O DYE: CPT

## 2020-05-11 PROCEDURE — 99285 EMERGENCY DEPT VISIT HI MDM: CPT

## 2020-05-11 PROCEDURE — 93005 ELECTROCARDIOGRAM TRACING: CPT | Performed by: STUDENT IN AN ORGANIZED HEALTH CARE EDUCATION/TRAINING PROGRAM

## 2020-05-11 PROCEDURE — 72125 CT NECK SPINE W/O DYE: CPT

## 2020-05-11 PROCEDURE — G0480 DRUG TEST DEF 1-7 CLASSES: HCPCS

## 2020-05-11 PROCEDURE — 82140 ASSAY OF AMMONIA: CPT

## 2020-05-11 PROCEDURE — 84484 ASSAY OF TROPONIN QUANT: CPT

## 2020-05-11 PROCEDURE — 80307 DRUG TEST PRSMV CHEM ANLYZR: CPT

## 2020-05-11 PROCEDURE — 84703 CHORIONIC GONADOTROPIN ASSAY: CPT

## 2020-05-11 PROCEDURE — 81001 URINALYSIS AUTO W/SCOPE: CPT

## 2020-05-11 PROCEDURE — 80053 COMPREHEN METABOLIC PANEL: CPT

## 2020-05-11 PROCEDURE — 85025 COMPLETE CBC W/AUTO DIFF WBC: CPT

## 2020-05-11 ASSESSMENT — PAIN SCALES - GENERAL: PAINLEVEL_OUTOF10: 9
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
## 2020-05-11 NOTE — ED NOTES
Pt rolling around on ED stretcher repeatedly trying to get up. Pt naked and continually ripping off clothes.     Pt assisted back to [facility], RN  05/11/20 8946
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
## 2020-05-12 VITALS
TEMPERATURE: 98.3 F | SYSTOLIC BLOOD PRESSURE: 128 MMHG | RESPIRATION RATE: 20 BRPM | OXYGEN SATURATION: 96 % | HEART RATE: 76 BPM | DIASTOLIC BLOOD PRESSURE: 72 MMHG

## 2020-05-12 LAB
CULTURE: NORMAL
EKG ATRIAL RATE: 114 BPM
EKG P AXIS: 54 DEGREES
EKG P-R INTERVAL: 134 MS
EKG Q-T INTERVAL: 324 MS
EKG QRS DURATION: 76 MS
EKG QTC CALCULATION (BAZETT): 446 MS
EKG R AXIS: 41 DEGREES
EKG T AXIS: 32 DEGREES
EKG VENTRICULAR RATE: 114 BPM
Lab: NORMAL
SPECIMEN DESCRIPTION: NORMAL
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
## 2020-05-12 NOTE — ED NOTES
Pt resting on cot with eyes closed, RR even and unlabored, NAD, call light in reach     Keagan Cook, LULU  05/12/20 3653
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
## 2020-05-12 NOTE — ED NOTES
Pt attempted to get out of bed. Pt is trying to speak but all the words are mumbled. Pt is alert to place and person. Pt states she wants to smoke and feel irritated. RR even and unlabored, NAD, call light in reach.       Josafat Betts RN  05/11/20 9273

## 2020-05-12 NOTE — ED NOTES
Attempted to take pt to CT around 1910. Pt was continually trying to get out of bed and could not get re oriented. At 2015 pt attempted to get out of bed.  updated that pt is still not ready for CT as any stimulation makes pt wake up, confused, and attempts to get back out of bed.       Inderjit Levi RN  05/11/20 2036

## 2020-06-22 ENCOUNTER — NURSE TRIAGE (OUTPATIENT)
Dept: OTHER | Facility: CLINIC | Age: 40
End: 2020-06-22

## 2020-07-25 ENCOUNTER — HOSPITAL ENCOUNTER (OUTPATIENT)
Age: 40
Setting detail: OBSERVATION
Discharge: HOME | End: 2020-07-26
Attending: EMERGENCY MEDICINE | Admitting: EMERGENCY MEDICINE
Payer: MEDICARE

## 2020-07-25 ENCOUNTER — APPOINTMENT (OUTPATIENT)
Dept: GENERAL RADIOLOGY | Age: 40
End: 2020-07-25
Payer: MEDICARE

## 2020-07-25 PROBLEM — M79.671 RIGHT FOOT PAIN: Status: ACTIVE | Noted: 2020-07-25

## 2020-07-25 PROCEDURE — 99285 EMERGENCY DEPT VISIT HI MDM: CPT

## 2020-07-25 PROCEDURE — 73630 X-RAY EXAM OF FOOT: CPT

## 2020-07-25 PROCEDURE — G0378 HOSPITAL OBSERVATION PER HR: HCPCS

## 2020-07-25 PROCEDURE — 96372 THER/PROPH/DIAG INJ SC/IM: CPT

## 2020-07-25 PROCEDURE — 6360000002 HC RX W HCPCS: Performed by: GENERAL PRACTICE

## 2020-07-25 RX ORDER — KETOROLAC TROMETHAMINE 15 MG/ML
15 INJECTION, SOLUTION INTRAMUSCULAR; INTRAVENOUS ONCE
Status: COMPLETED | OUTPATIENT
Start: 2020-07-25 | End: 2020-07-25

## 2020-07-25 RX ADMIN — KETOROLAC TROMETHAMINE 15 MG: 15 INJECTION, SOLUTION INTRAMUSCULAR; INTRAVENOUS at 19:35

## 2020-07-25 ASSESSMENT — ENCOUNTER SYMPTOMS
RESPIRATORY NEGATIVE: 1
COLOR CHANGE: 0
GASTROINTESTINAL NEGATIVE: 1
NAUSEA: 0
VOMITING: 0

## 2020-07-25 ASSESSMENT — PAIN DESCRIPTION - LOCATION: LOCATION: FOOT

## 2020-07-25 ASSESSMENT — PAIN SCALES - GENERAL
PAINLEVEL_OUTOF10: 6
PAINLEVEL_OUTOF10: 6

## 2020-07-25 ASSESSMENT — PAIN DESCRIPTION - ORIENTATION: ORIENTATION: RIGHT

## 2020-07-26 ENCOUNTER — APPOINTMENT (OUTPATIENT)
Dept: MRI IMAGING | Age: 40
End: 2020-07-26
Payer: MEDICARE

## 2020-07-26 VITALS
RESPIRATION RATE: 17 BRPM | HEART RATE: 77 BPM | BODY MASS INDEX: 29.95 KG/M2 | SYSTOLIC BLOOD PRESSURE: 116 MMHG | OXYGEN SATURATION: 98 % | TEMPERATURE: 98.9 F | WEIGHT: 180 LBS | DIASTOLIC BLOOD PRESSURE: 79 MMHG

## 2020-07-26 LAB
ABSOLUTE EOS #: 0.14 K/UL (ref 0–0.44)
ABSOLUTE IMMATURE GRANULOCYTE: <0.03 K/UL (ref 0–0.3)
ABSOLUTE LYMPH #: 2.81 K/UL (ref 1.1–3.7)
ABSOLUTE MONO #: 0.59 K/UL (ref 0.1–1.2)
ANION GAP SERPL CALCULATED.3IONS-SCNC: 11 MMOL/L (ref 9–17)
BASOPHILS # BLD: 1 % (ref 0–2)
BASOPHILS ABSOLUTE: 0.04 K/UL (ref 0–0.2)
BUN BLDV-MCNC: 14 MG/DL (ref 6–20)
BUN/CREAT BLD: NORMAL (ref 9–20)
C-REACTIVE PROTEIN: 4.8 MG/L (ref 0–5)
CALCIUM SERPL-MCNC: 9 MG/DL (ref 8.6–10.4)
CHLORIDE BLD-SCNC: 101 MMOL/L (ref 98–107)
CO2: 26 MMOL/L (ref 20–31)
CREAT SERPL-MCNC: 0.63 MG/DL (ref 0.5–0.9)
DIFFERENTIAL TYPE: ABNORMAL
EOSINOPHILS RELATIVE PERCENT: 2 % (ref 1–4)
GFR AFRICAN AMERICAN: >60 ML/MIN
GFR NON-AFRICAN AMERICAN: >60 ML/MIN
GFR SERPL CREATININE-BSD FRML MDRD: NORMAL ML/MIN/{1.73_M2}
GFR SERPL CREATININE-BSD FRML MDRD: NORMAL ML/MIN/{1.73_M2}
GLUCOSE BLD-MCNC: 95 MG/DL (ref 70–99)
HCT VFR BLD CALC: 37 % (ref 36.3–47.1)
HEMOGLOBIN: 12.1 G/DL (ref 11.9–15.1)
IMMATURE GRANULOCYTES: 0 %
LYMPHOCYTES # BLD: 48 % (ref 24–43)
MCH RBC QN AUTO: 28.9 PG (ref 25.2–33.5)
MCHC RBC AUTO-ENTMCNC: 32.7 G/DL (ref 28.4–34.8)
MCV RBC AUTO: 88.3 FL (ref 82.6–102.9)
MONOCYTES # BLD: 10 % (ref 3–12)
NRBC AUTOMATED: 0 PER 100 WBC
PDW BLD-RTO: 14.3 % (ref 11.8–14.4)
PLATELET # BLD: 314 K/UL (ref 138–453)
PLATELET ESTIMATE: ABNORMAL
PMV BLD AUTO: 9.1 FL (ref 8.1–13.5)
POTASSIUM SERPL-SCNC: 4 MMOL/L (ref 3.7–5.3)
RBC # BLD: 4.19 M/UL (ref 3.95–5.11)
RBC # BLD: ABNORMAL 10*6/UL
SEDIMENTATION RATE, ERYTHROCYTE: 21 MM (ref 0–20)
SEG NEUTROPHILS: 39 % (ref 36–65)
SEGMENTED NEUTROPHILS ABSOLUTE COUNT: 2.27 K/UL (ref 1.5–8.1)
SODIUM BLD-SCNC: 138 MMOL/L (ref 135–144)
WBC # BLD: 5.9 K/UL (ref 3.5–11.3)
WBC # BLD: ABNORMAL 10*3/UL

## 2020-07-26 PROCEDURE — 6360000002 HC RX W HCPCS: Performed by: STUDENT IN AN ORGANIZED HEALTH CARE EDUCATION/TRAINING PROGRAM

## 2020-07-26 PROCEDURE — G0378 HOSPITAL OBSERVATION PER HR: HCPCS

## 2020-07-26 PROCEDURE — 80048 BASIC METABOLIC PNL TOTAL CA: CPT

## 2020-07-26 PROCEDURE — 85025 COMPLETE CBC W/AUTO DIFF WBC: CPT

## 2020-07-26 PROCEDURE — 85652 RBC SED RATE AUTOMATED: CPT

## 2020-07-26 PROCEDURE — 6370000000 HC RX 637 (ALT 250 FOR IP): Performed by: STUDENT IN AN ORGANIZED HEALTH CARE EDUCATION/TRAINING PROGRAM

## 2020-07-26 PROCEDURE — 96372 THER/PROPH/DIAG INJ SC/IM: CPT

## 2020-07-26 PROCEDURE — 86140 C-REACTIVE PROTEIN: CPT

## 2020-07-26 PROCEDURE — 73718 MRI LOWER EXTREMITY W/O DYE: CPT

## 2020-07-26 RX ORDER — CLONIDINE HYDROCHLORIDE 0.2 MG/1
0.2 TABLET ORAL 2 TIMES DAILY
COMMUNITY

## 2020-07-26 RX ORDER — HYDROXYZINE HYDROCHLORIDE 50 MG/ML
25 INJECTION, SOLUTION INTRAMUSCULAR EVERY 8 HOURS PRN
Status: DISCONTINUED | OUTPATIENT
Start: 2020-07-26 | End: 2020-07-26 | Stop reason: HOSPADM

## 2020-07-26 RX ORDER — BUPRENORPHINE HYDROCHLORIDE AND NALOXONE HYDROCHLORIDE DIHYDRATE 8; 2 MG/1; MG/1
1 TABLET SUBLINGUAL 2 TIMES DAILY
Status: DISCONTINUED | OUTPATIENT
Start: 2020-07-26 | End: 2020-07-26 | Stop reason: HOSPADM

## 2020-07-26 RX ORDER — BUPRENORPHINE HYDROCHLORIDE AND NALOXONE HYDROCHLORIDE DIHYDRATE 2; .5 MG/1; MG/1
1 TABLET SUBLINGUAL 2 TIMES DAILY
Status: DISCONTINUED | OUTPATIENT
Start: 2020-07-26 | End: 2020-07-26

## 2020-07-26 RX ORDER — GABAPENTIN 300 MG/1
300 CAPSULE ORAL 3 TIMES DAILY
COMMUNITY

## 2020-07-26 RX ORDER — GABAPENTIN 600 MG/1
300 TABLET ORAL 3 TIMES DAILY
Status: DISCONTINUED | OUTPATIENT
Start: 2020-07-26 | End: 2020-07-26 | Stop reason: HOSPADM

## 2020-07-26 RX ORDER — TRAZODONE HYDROCHLORIDE 150 MG/1
150 TABLET ORAL 2 TIMES DAILY
COMMUNITY

## 2020-07-26 RX ORDER — BUPRENORPHINE HYDROCHLORIDE AND NALOXONE HYDROCHLORIDE DIHYDRATE 2; .5 MG/1; MG/1
2 TABLET SUBLINGUAL DAILY
Status: DISCONTINUED | OUTPATIENT
Start: 2020-07-26 | End: 2020-07-26

## 2020-07-26 RX ORDER — HYDROXYZINE PAMOATE 25 MG/1
25 CAPSULE ORAL 3 TIMES DAILY PRN
COMMUNITY

## 2020-07-26 RX ADMIN — TRAZODONE HYDROCHLORIDE 150 MG: 50 TABLET ORAL at 04:13

## 2020-07-26 RX ADMIN — HYDROXYZINE HYDROCHLORIDE 25 MG: 50 INJECTION, SOLUTION INTRAMUSCULAR at 03:40

## 2020-07-26 RX ADMIN — GABAPENTIN 300 MG: 600 TABLET ORAL at 10:06

## 2020-07-26 RX ADMIN — BUPRENORPHINE AND NALOXONE 1 TABLET: 8; 2 TABLET SUBLINGUAL at 10:06

## 2020-07-26 ASSESSMENT — ENCOUNTER SYMPTOMS
COUGH: 0
ABDOMINAL PAIN: 0
EYE ITCHING: 0
VOMITING: 0
DIARRHEA: 0
TROUBLE SWALLOWING: 0
SHORTNESS OF BREATH: 0
NAUSEA: 0
EYE PAIN: 0
RHINORRHEA: 0
CHEST TIGHTNESS: 0
CHOKING: 0
EYE DISCHARGE: 0

## 2020-07-26 ASSESSMENT — PAIN DESCRIPTION - PAIN TYPE: TYPE: ACUTE PAIN;CHRONIC PAIN

## 2020-07-26 ASSESSMENT — PAIN SCALES - GENERAL
PAINLEVEL_OUTOF10: 6
PAINLEVEL_OUTOF10: 4
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
## 2020-07-26 NOTE — ED NOTES
Bed: 47PED  Expected date:   Expected time:   Means of arrival:   Comments:     Samir Vazquez RN  07/25/20 4947
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
## 2020-07-26 NOTE — DISCHARGE SUMMARY
CDU Discharge Summary        Patient:  Peyton Chavez  YOB: 1980    MRN: [id number]   Acct: [de-identified]    Primary Care Physician: No primary care provider on file. Admit date:  7/25/2020  6:57 PM  Discharge date: 1:43 PM 7/26/2020    Discharge Diagnoses:     1.) Acute right foot pain secondary to avascular necrosis      Follow-up:  Call today/tomorrow for a follow up appointment with No primary care provider on file. , or return to the Emergency Room with worsening symptoms    Stressed to patient the importance of following up with primary care doctor for further workup/management of symptoms. Pt verbalizes understanding and agrees with plan.     Discharge Medication Changes:       Medication List      CONTINUE taking these medications    cloNIDine 0.2 MG tablet  Commonly known as:  CATAPRES     gabapentin 300 MG capsule  Commonly known as:  NEURONTIN     hydrOXYzine 25 MG capsule  Commonly known as:  VISTARIL     Suboxone 4-1 MG Film SL film  Generic drug:  buprenorphine-naloxone     traZODone 150 MG tablet  Commonly known as:  DESYREL            Diet:  DIET GENERAL;, advance as tolerated     Activity:  As tolerated    Consultants: IP CONSULT TO PODIATRY    Procedures:  Not indicated     Diagnostic Test:   Results for orders placed or performed during the hospital encounter of 07/25/20   CBC Auto Differential   Result Value Ref Range    WBC 5.9 3.5 - 11.3 k/uL    RBC 4.19 3.95 - 5.11 m/uL    Hemoglobin 12.1 11.9 - 15.1 g/dL    Hematocrit 37.0 36.3 - 47.1 %    MCV 88.3 82.6 - 102.9 fL    MCH 28.9 25.2 - 33.5 pg    MCHC 32.7 28.4 - 34.8 g/dL    RDW 14.3 11.8 - 14.4 %    Platelets 120 960 - 437 k/uL    MPV 9.1 8.1 - 13.5 fL    NRBC Automated 0.0 0.0 per 100 WBC    Differential Type NOT REPORTED     Seg Neutrophils 39 36 - 65 %    Lymphocytes 48 (H) 24 - 43 %    Monocytes 10 3 - 12 %    Eosinophils % 2 1 - 4 %    Basophils 1 0 - 2 %    Immature Granulocytes 0 0 %    Segs Absolute 2.27 1.50 - 8.10 k/uL    Absolute Lymph # 2.81 1.10 - 3.70 k/uL    Absolute Mono # 0.59 0.10 - 1.20 k/uL    Absolute Eos # 0.14 0.00 - 0.44 k/uL    Basophils Absolute 0.04 0.00 - 0.20 k/uL    Absolute Immature Granulocyte <0.03 0.00 - 0.30 k/uL    WBC Morphology NOT REPORTED     RBC Morphology NOT REPORTED     Platelet Estimate NOT REPORTED    Basic Metabolic Panel   Result Value Ref Range    Glucose 95 70 - 99 mg/dL    BUN 14 6 - 20 mg/dL    CREATININE 0.63 0.50 - 0.90 mg/dL    Bun/Cre Ratio NOT REPORTED 9 - 20    Calcium 9.0 8.6 - 10.4 mg/dL    Sodium 138 135 - 144 mmol/L    Potassium 4.0 3.7 - 5.3 mmol/L    Chloride 101 98 - 107 mmol/L    CO2 26 20 - 31 mmol/L    Anion Gap 11 9 - 17 mmol/L    GFR Non-African American >60 >60 mL/min    GFR African American >60 >60 mL/min    GFR Comment          GFR Staging NOT REPORTED    C-REACTIVE PROTEIN   Result Value Ref Range    CRP 4.8 0.0 - 5.0 mg/L   Sedimentation Rate   Result Value Ref Range    Sed Rate 21 (H) 0 - 20 mm     Xr Foot Right (min 3 Views)    Result Date: 7/25/2020  EXAMINATION: THREE XRAY VIEWS OF THE RIGHT FOOT 7/25/2020 8:19 pm COMPARISON: None. HISTORY: ORDERING SYSTEM PROVIDED HISTORY: stepped on pitch fork 4wks ago, pain and swelling on forefoot TECHNOLOGIST PROVIDED HISTORY: stepped on pitch fork 4wks ago, pain and swelling on forefoot FINDINGS: Erosions 3rd metatarsal phalangeal joint. Cortical margins otherwise intact. His bunion 1st metatarsal head. Alignment anatomic. Soft tissues unremarkable. No subcutaneous gas. Erosions 3rd metatarsal phalangeal joint suspicious for infection. Mri Foot Right Wo Contrast    Result Date: 7/26/2020  EXAMINATION: MRI OF THE RIGHT FOOT WITHOUT CONTRAST, 7/26/2020 9:32 am TECHNIQUE: Multiplanar multisequence MRI of the right foot was performed without the administration of intravenous contrast. COMPARISON: None.   Correlation is made to foot radiograph dated July 25, 2020 HISTORY: ORDERING SYSTEM PROVIDED HISTORY: puncture wound to right foot 1 mo ago, concern for osteo on XR TECHNOLOGIST PROVIDED HISTORY: puncture wound to right foot 1 mo ago, concern for osteo on XR What is the area of interest?->Forefoot Is the patient pregnant?->No FINDINGS: LISFRANC JOINT: Lisfranc alignment is maintained. Lisfranc ligament is intact. BONE MARROW: Abnormal bone marrow signal along the medial margin of the 3rd toe proximal phalanx and 3rd metatarsal head consists of increased signal on the fluid sensitive sequences and decreased signal on the T1 weighted images. GREATER AND LESSER MTP JOINTS: Alignment is anatomic. No appreciable joint effusion. SOFT TISSUES: Subcutaneous edema in the foot dorsum. There is also edema around the 3rd metatarsal diaphysis. Within the limitations of a noncontrast exam, there is no formed fluid collection or abscess. TENDONS: Visible flexor and extensor tendons are grossly intact. 1. Abnormal bone marrow signal along the medial margin of the 3rd toe proximal phalanx and 3rd metatarsal head/distal diaphysis. Given the clinical context, differential includes osteomyelitis; however, periarticular involvement with lack of joint effusion is atypical for an acute osteomyelitis. Please correlate with site of reported puncture wound. Differential includes healing fracture and avascular necrosis. 2. Subcutaneous edema/inflammation of the foot dorsum. 3. Myositis of the intrinsic foot musculature around the 3rd metatarsal.           Physical Exam:    General appearance - NAD, AOx 3   Lungs -CTAB, no R/R/R  Heart - RRR, no M/R/G  Abdomen - Soft, NT/ND  Neurological:  MAEx4, No focal motor deficit, sensory loss  Extremities - Cap refil <2 sec in all ext., Right foot puncture wound on the plantar surface  Skin -warm, dry      Hospital Course:  Clinical course has improved, labs and imaging reviewed.      Marilu Wild originally presented to the hospital on 7/25/2020  6:57 PM with complains of acute right foot pain on the plantar surface. At that time it was determined that She required further observation and Podiatry consult. X-ray imaging of right foot showed erosion of 3rd metatarsal phalangeal joint suspicious for infection. MRI of foot showed abnormal bone marrow signal possible due to avascular necrosis. Patient will be discharged with DME post-op surgical boots and follow up with Podiatry in 2 weeks. Labs and imaging were followed daily. Imaging results as above. She is medically stable to be discharged. Disposition: Home    Patient stated that they will not drive themselves home from the hospital if they have gotten pain killers/ narcotics earlier that day and that they will arrange for transportation on their own or work with the  for a ride. Patient counseled NOT to drive while under the influence of narcotics/ pain killers. Condition: Good    Patient stable and ready for discharge home. I have discussed plan of care with patient and they are in understanding. They were instructed to read discharge paperwork. All of their questions and concerns were addressed. Time Spent: 0 day      --  Judith Roe MD  Emergency Medicine Resident Physician    This dictation was generated by voice recognition computer software. Although all attempts are made to edit the dictation for accuracy, there may be errors in the transcription that are not intended.

## 2020-07-26 NOTE — CARE COORDINATION
Case Management Initial Discharge Plan  Sheyla Owens,             Met with:patient to discuss discharge plans. Information verified: address, contacts, phone number, , insurance Yes    Emergency Contact/Next of Kin name & number: Yue Bain [phone]    PCP: No primary care provider on file. Date of last visit: CLinic list given     Insurance Provider: [employer]     Discharge Planning    Living Arrangements:  Family Members   Support Systems:  Family Members    Home has 1 stories  4 stairs to climb to get into front door    Patient able to perform ADL's:Independent    Current Services (outpatient & in home) none   DME equipment: na  DME provider: na    Receiving oral anticoagulation therapy? No    If indicated:   Physician managing anticoagulation treatment: na  Where does patient obtain lab work for ATC treatment? na      Potential Assistance Needed:  N/A    Patient agreeable to home care: No  [place] of choice provided:  n/a    Prior SNF/Rehab Placement and Facility: none   Agreeable to SNF/Rehab: No  [place] of choice provided: n/a     Evaluation: no    Expected Discharge date:       Patient expects to be discharged to:  Home  Follow Up Appointment: Best Day/ Time:      Transportation provider: Friend or cab   Transportation arrangements needed for discharge: Maybe     Readmission Risk              Risk of Unplanned Readmission:        0             Does patient have a readmission risk score greater than 14?: THEE   If yes, follow-up appointment must be made within 7 days of discharge.      Goals of Care: Get foot better       Discharge Plan: Home with cab ride           Electronically signed by Josué Lira RN on 20 at 2:43 PM EDT

## 2020-07-26 NOTE — ED PROVIDER NOTES
[address] HANDOFF       Handoff taken on the following patient from prior Attending Physician:  Pt Name: Homero Martínez  PCP:  No primary care provider on file. Attestation  I was available and discussed any additional care issues that arose and coordinated the management plans with the resident(s) caring for the patient during my duty period. Any areas of disagreement with resident's documentation of care or procedures are noted on the chart. I was personally present for the key portions of any/all procedures during my duty period. I have documented in the chart those procedures where I was not present during the key portions.              Suzie Martinez MD  07/25/20 [id number]

## 2020-07-26 NOTE — ED NOTES
Attempted to start IV, good blood return, first IV patient refused to let it leave in, verbalizing \"it burns, it hurts, take it out\"  2nd attempt - as soon as IV cathalon inserted patient verbalizes \"OMG, it feels like a cigarette is burning me, take it out!!!\"  Unable to obtain labs or IV       Dony Hanna, LULU  07/25/20 [id number]

## 2020-07-26 NOTE — CONSULTS
Consultation Note  Podiatric Medicine and Surgery     Subjective     Chief Complaint: Right foot pain    HPI:  Earl Bonner is a 35 y.o. female seen at [facility] ED for right foot pain. Patient reports that approximately 1 month ago she stepped on a pitchfork while working in the garden. She reports this went into the bottom of her foot. She had an x-ray done which indicated a small fracture. However, she sought no further treatment. Patient denies receiving any antibiotic therapy at the time of injury. Today, she reinjured the area due to \"stubbing\" her foot at home. Patient is currently residing at [address] [place] [address],[address]. Patient denies history of type 2 diabetes. Patient reports history of chronic IV drug abuse. However, patient is currently in recovery. Patient denies any other pedal complaints at this time. Meek Santana PCP is No primary care provider on file. ROS:   Review of Systems   Constitutional: Negative for chills and fever. Gastrointestinal: Negative for nausea and vomiting. Musculoskeletal: Positive for arthralgias and myalgias. Skin: Negative for color change and wound. Neurological: Negative for dizziness and light-headedness. Psychiatric/Behavioral: The patient is nervous/anxious. Past Medical History   has a past medical history of Anxiety. Past Surgical History   has no past surgical history on file. Medications  Prior to Admission medications    Medication Sig Start Date End Date Taking? Authorizing Provider   buprenorphine-naloxone (SUBOXONE) 4-1 MG FILM SL film Place 1 Film under the tongue daily. Meek Santana Historical Provider, MD    Scheduled Meds:   Tetanus-Diphth-Acell Pertussis  0.5 mL Intramuscular Once     Continuous Infusions:  PRN Meds:. Allergies  has No Known Allergies. Family History  family history is not on file. Social History   reports that she has been smoking cigarettes. She has a 15.00 pack-year smoking history.  She has never used Images:        Imaging:   XR FOOT RIGHT (MIN 3 VIEWS)   Final Result   Erosions 3rd metatarsal phalangeal joint suspicious for infection. MRI FOOT RIGHT WO CONTRAST    (Results Pending)       Cultures: None    Assessment     Anh Riddle is a 35 y.o. female with   1. Osteomyelitis, 3rd metatarsal head, right foot  2. Plantar plate injury, right foot    Active Problems:    Right foot pain  Resolved Problems:    * No resolved hospital problems. *        Plan     · Patient examined and evaluated at bedside   · Treatment options discussed in detail with the patient  · Due to concern for possible osteomyelitis of the 3rd metatarsal head of the right foot, patient to be admitted under observation for MRI of the right foot. MRI ordered without contrast due to inability to get IV access. Patient anxious about admission but after explaining clinical reasoning, patient verbalized understanding and is amenable to admission. · 2nd and 3rd toes of right foot splinted in plantarflexed position with tape. Surgical shoe ordered for the right foot.        · WBAT to Right lower extremity in surgical shoe  · Discussed with Dr. Randall Henry DPM   Podiatric Medicine & Surgery   7/25/2020 at 10:27 PM

## 2020-07-26 NOTE — ED NOTES
Podiatry resident called to notify emergency team that they are okay with discharge and follow up outpatient.       [facility]  07/26/20 7678

## 2020-07-26 NOTE — H&P
[address]  CDU / OBSERVATION ENCOUNTER  RESIDENT NOTE     Pt Name: Carmella Dance  MRN: [id number]  [place] 1980  Date of evaluation: 7/26/20  Patient's PCP is : No primary care provider on file. CHIEF COMPLAINT       Chief Complaint   Patient presents with    Foot Pain         HISTORY OF PRESENT ILLNESS    Carmella Dance is a 35 y.o. female who presented to the ED yesterday with right foot pain, patient states that she stepped on a pitchfork approximately 1 month ago that punctured through her foot. X-ray of right foot demonstrated erosions of 3rd metatarsal phalangeal joint suspicious for infection. Patient was seen by podiatry, and concerns of osteomyelitis of third metatarsal was determined. Plan for MRI of right foot today    Location/Symptom: Right foot pain  Timing/Onset: Started yesterday  Provocation: Stepped on a pitchfork a month ago  Quality: Sharp pain  Radiation: None  Severity: 8 out of 10  Timing/Duration: A month ago  Modifying Factors: Kicked her foot on accident while in the kitchen    REVIEW OF SYSTEMS       Review of Systems   Constitutional: Negative for activity change, appetite change and fatigue. HENT: Negative for congestion, rhinorrhea and trouble swallowing. Eyes: Negative for pain, discharge and itching. Respiratory: Negative for cough, choking, chest tightness and shortness of breath. Cardiovascular: Positive for leg swelling (Left foot swelling). Negative for chest pain. Gastrointestinal: Negative for abdominal pain, diarrhea, nausea and vomiting. Endocrine: Negative for cold intolerance and heat intolerance. Genitourinary: Negative for dysuria, frequency and hematuria. Musculoskeletal: Positive for arthralgias and gait problem (Due to pain on left foot). Negative for neck pain. Skin: Positive for wound (3rd metatarsal puncture wound). Negative for rash. Neurological: Negative for dizziness, light-headedness and headaches. Psychiatric/Behavioral: Negative for agitation and confusion. The patient is not nervous/anxious. (PQRS) Advance directives on face sheet per hospital policy. No change unless specifically mentioned in chart    PAST MEDICAL HISTORY    has a past medical history of Anxiety. I have reviewed the past medical history with the patient and it is pertinent to this complaint. SURGICAL HISTORY      has no past surgical history on file. I have reviewed and agree with Surgical History entered and it is pertinent to this complaint. CURRENT MEDICATIONS     gabapentin (NEURONTIN) tablet 300 mg, TID  hydrOXYzine (VISTARIL) injection 25 mg, Q8H PRN  traZODone (DESYREL) tablet 150 mg, Nightly  buprenorphine-naloxone (SUBOXONE) 8-2 MG SL tablet 1 tablet, BID  Tetanus-Diphth-Acell Pertussis (BOOSTRIX) injection 0.5 mL, Once        All medication charted and reviewed. ALLERGIES     has No Known Allergies. FAMILY HISTORY     has no family status information on file. family history is not on file. The patient denies any pertinent family history. I have reviewed and agree with the family history entered. I have reviewed the Family History and it is not significant to the case    SOCIAL HISTORY      reports that she has been smoking cigarettes. She has a 15.00 pack-year smoking history. She has never used smokeless tobacco. She reports current drug use. Drugs: IV and Opiates . She reports that she does not drink alcohol. I have reviewed and agree with all Social.  There are no concerns for substance abuse/use. PHYSICAL EXAM     INITIAL VITALS:  weight is 180 lb (81.6 kg). Her oral temperature is 98.8 °F (37.1 °C). Her blood pressure is 124/82 and her pulse is 95. Her respiration is 19 and oxygen saturation is 98%. Physical Exam  Constitutional:       General: She is not in acute distress. Appearance: She is normal weight. She is not ill-appearing.    HENT:      Head: Normocephalic and atraumatic. Eyes:      Extraocular Movements: Extraocular movements intact. Conjunctiva/sclera: Conjunctivae normal.      Pupils: Pupils are equal, round, and reactive to light. Neck:      Musculoskeletal: No neck rigidity or muscular tenderness. Cardiovascular:      Rate and Rhythm: Normal rate and regular rhythm. Heart sounds: Normal heart sounds. Pulmonary:      Effort: No respiratory distress. Breath sounds: Normal breath sounds. No wheezing. Abdominal:      General: There is no distension. Palpations: Abdomen is soft. Tenderness: There is no abdominal tenderness. Musculoskeletal:         General: Swelling and tenderness present. Comments: Swelling and tenderness of right foot, mainly around 3rd metatarsals   Skin:     General: Skin is warm and dry. Capillary Refill: Capillary refill takes less than 2 seconds. Findings: Lesion (Present at 3rd metatarsals) present. Neurological:      General: No focal deficit present. Mental Status: She is alert and oriented to person, place, and time. Psychiatric:         Mood and Affect: Mood normal.         Behavior: Behavior normal.         Thought Content: Thought content normal.         Judgment: Judgment normal.           DIFFERENTIAL DIAGNOSIS/MDM:     Osteomyelitis  Cellulitis    DIAGNOSTIC RESULTS       RADIOLOGY:   I directly visualized the following  images and reviewed the radiologist interpretations:    Xr Foot Right (min 3 Views)    Result Date: 7/25/2020  EXAMINATION: THREE XRAY VIEWS OF THE RIGHT FOOT 7/25/2020 8:19 pm COMPARISON: None. HISTORY: ORDERING SYSTEM PROVIDED HISTORY: stepped on pitch fork 4wks ago, pain and swelling on forefoot TECHNOLOGIST PROVIDED HISTORY: stepped on pitch fork 4wks ago, pain and swelling on forefoot FINDINGS: Erosions 3rd metatarsal phalangeal joint. Cortical margins otherwise intact. His bunion 1st metatarsal head. Alignment anatomic. Soft tissues unremarkable.   No subcutaneous gas. Erosions 3rd metatarsal phalangeal joint suspicious for infection. LABS:  I have reviewed and interpreted all available lab results. Labs Reviewed   CBC WITH AUTO DIFFERENTIAL - Abnormal; Notable for the following components:       Result Value    Lymphocytes 48 (*)     All other components within normal limits   SEDIMENTATION RATE - Abnormal; Notable for the following components:    Sed Rate 21 (*)     All other components within normal limits   BASIC METABOLIC PANEL   C-REACTIVE PROTEIN         CDU TAMARA / aMrian Serna is a 35 y.o. female who presents with acute right foot pain secondary to osteomyelitis    · Pain control with Toradol  · Possible start IV antibiotics with vancomycin  · Podiatry consult  · MRI without contrast of right foot  · Weight bearing as tolerated to right lower extremity and surgical shoe  · Continue home medications and pain control  · Monitor vitals, labs, and imaging  · DISPO: pending consults and clinical improvement    CONSULTS:    IP CONSULT TO PODIATRY    PROCEDURES:  Not indicated       PATIENT REFERRED TO:    No follow-up provider specified. --  Judith Roe MD   Emergency Medicine Resident     This dictation was generated by voice recognition computer software. Although all attempts are made to edit the dictation for accuracy, there may be errors in the transcription that are not intended.

## 2020-07-26 NOTE — ED PROVIDER NOTES
[facility] ED  Emergency Department Encounter  EmergencyMedicine Resident     Pt Kellie Vernon  MRN: [id number]  [place] 1980  Date of evaluation: 7/25/20  PCP:  No primary care provider on file. CHIEF COMPLAINT       Chief Complaint   Patient presents with    Foot Pain       HISTORY OF PRESENT ILLNESS  (Location/Symptom, Timing/Onset, Context/Setting, Quality, Duration, Modifying Factors, Severity.)      Peyton Chavez is a 35 y.o. female who presents with right foot pain. Patient states that she stepped on a pitchfork approximately 1 month ago that punctured through the forefoot. She has not sought care and has experienced foot pain since. She was in her kitchen today and kicked something by accident, causing significant pain. She has had mild swelling of the foot since initial incident 1 month ago. PAST MEDICAL / SURGICAL / SOCIAL / FAMILY HISTORY      has a past medical history of Anxiety. has no past surgical history on file.       Social History     Socioeconomic History    Marital status: Single     Spouse name: Not on file    Number of children: Not on file    Years of education: Not on file    Highest education level: Not on file   Occupational History    Not on file   Social Needs    Financial resource strain: Not on file    Food insecurity     Worry: Not on file     Inability: Not on file    Transportation needs     Medical: Not on file     Non-medical: Not on file   Tobacco Use    Smoking status: Heavy Tobacco Smoker     Packs/day: 1.00     Years: 15.00     Pack years: 15.00     Types: Cigarettes    Smokeless tobacco: Never Used   Substance and Sexual Activity    Alcohol use: No    Drug use: Yes     Types: IV, Opiates     Sexual activity: Not on file   Lifestyle    Physical activity     Days per week: Not on file     Minutes per session: Not on file    Stress: Not on file   Relationships    Social connections     Talks on phone: Not on file Gets together: Not on file     Attends Yarsani service: Not on file     Active member of club or organization: Not on file     Attends meetings of clubs or organizations: Not on file     Relationship status: Not on file    Intimate partner violence     Fear of current or ex partner: Not on file     Emotionally abused: Not on file     Physically abused: Not on file     Forced sexual activity: Not on file   Other Topics Concern    Not on file   Social History Narrative    Not on file       History reviewed. No pertinent family history. Allergies:  Patient has no known allergies. Home Medications:  Prior to Admission medications    Medication Sig Start Date End Date Taking? Authorizing Provider   buprenorphine-naloxone (SUBOXONE) 4-1 MG FILM SL film Place 1 Film under the tongue daily. Eva Paz Historical Provider, MD       REVIEW OF SYSTEMS    (2-9 systems for level 4, 10 or more for level 5)      Review of Systems   Constitutional: Negative for chills and fever. HENT: Negative. Respiratory: Negative. Cardiovascular: Negative. Gastrointestinal: Negative. Genitourinary: Negative. Musculoskeletal: Positive for arthralgias and joint swelling. Skin: Positive for wound. Psychiatric/Behavioral: Negative. PHYSICAL EXAM   (up to 7 for level 4, 8 or more for level 5)      INITIAL VITALS:   /82   Pulse 95   Temp 98.8 °F (37.1 °C) (Oral)   Resp 19   SpO2 98%     Physical Exam  Gen. Appearance: patient appears well, nondistressed. HEENT: head atraumatic, normocephalic. Pupils equal and reactive to light. Oropharynx clear and moist.  Neck: Supple, normal range of motion. No lymphadenopathy. Pulmonary: Lungs clear to auscultation bilaterally. Equal air entry right left. Cardiovascular:  Heart sounds normal, no murmurs. Peripheral pulses strong, regular, equal.   Abdomen: Soft, nontender, nondistended. Bowel sounds normal. No palpable masses. Neurology: GCS 15.   Oriented to person place and time. Normal tone and power in all 4 extremities. No sensory deficits. Skin: Warm, dry, well perfused  Musculoskeletal: Mild swelling of the dorsum of the right foot. Pain with range of motion of the toes on right. No surrounding cellulitic changes      DIFFERENTIAL  DIAGNOSIS     PLAN (LABS / IMAGING / EKG):  Orders Placed This Encounter   Procedures    XR FOOT RIGHT (MIN 3 VIEWS)    MRI FOOT RIGHT WO CONTRAST    CBC Auto Differential    Basic Metabolic Panel    C-REACTIVE PROTEIN    Sedimentation Rate    Inpatient consult to Podiatry    PATIENT STATUS (FROM ED OR OR/PROCEDURAL) Observation    DUR[employer] ORTHOPED[employer] SUPPLIES Post Op Shoe, Unisex - Right; MD (M7.5-9/F8.5-10)       MEDICATIONS ORDERED:  Orders Placed This Encounter   Medications    ketorolac (TORADOL) injection 15 mg    Tetanus-Diphth-Acell Pertussis (BOOSTRIX) injection 0.5 mL           DIAGNOSTIC RESULTS / EMERGENCY DEPARTMENT COURSE / MDM     LABS:  No results found for this visit on 07/25/20. RADIOLOGY:  None    EKG  None    All EKG's are interpreted by the Emergency Department Physician who either signs or Co-signs this chart in the absence of a cardiologist.    EMERGENCY DEPARTMENT COURSE:  35 y.o. female who presents with right foot 1 month after stepping on pitchfork. She did not seek care at that time. Reinjury today. X-ray shows concern for osteomyelitis. IV access could not be gained here in the ED. ED Course as of Jul 25 2228   Sat Jul 25, 2020   2218 Podiatry is evaluated patient and feels that placement in observation unit for MRI of the affected foot is prudent. Patient is agreeable. Of note, patient is a recovering addict and requests no narcotics be given    [JEREMI]   2219 Pt has no IV access and does not tolerate repeated attempts. Patient has no clinical signs of infection other than swelling. No cellulitic process. She is afebrile and not tachycardic.   Will hold off on IV access for now    [JEREMI]      ED Course User Index  [JEREMI] Arianna Brunner DO       PROCEDURES:  None    CONSULTS:  IP CONSULT TO PODIATRY    CRITICAL CARE:  None    FINAL IMPRESSION      1. Right foot pain            DISPOSITION / PLAN     DISPOSITION Admitted 07/25/2020 10:22:39 PM      PATIENT REFERRED TO:  No follow-up provider specified.     DISCHARGE MEDICATIONS:  New Prescriptions    No medications on file       Arianna Brunner DO  Emergency Medicine Resident    (Please note that portions of thisnote were completed with a voice recognition program.  Efforts were made to edit the dictations but occasionally words are mis-transcribed.)        Arianna Brunner DO  Resident  07/25/20 6669

## 2020-07-26 NOTE — PROGRESS NOTES
Progress Note  Podiatric Medicine and Surgery     Subjective     CC: Right foot pain    Patient seen and examined at bedside. No acute events overnight. Afebrile, vital signs stable   Patient received MRI, results nonspecific for osteomyelitis versus avascular necrosis versus healing bone fracture. HPI :  Kiya Younger is a 35 y.o. female seen at [facility]. [facility] ED for right foot pain. Patient reports that approximately 1 month ago she stepped on a pitchfork while working in the garden. She reports this went into the bottom of her foot. She had an x-ray done which indicated a small fracture. However, she sought no further treatment. Patient denies receiving any antibiotic therapy at the time of injury. Today, she reinjured the area due to \"stubbing\" her foot at home. Patient is currently residing at [address] [place] [address],[address]. Patient denies history of type 2 diabetes. Patient reports history of chronic IV drug abuse. However, patient is currently in recovery. Patient denies any other pedal complaints at this time. Tonye Cogan PCP is No primary care provider on file. ROS: Denies N/V/F/C/SOB/CP. Otherwise negative except at stated in the HPI.      Medications:  Scheduled Meds:   gabapentin  300 mg Oral TID    traZODone  150 mg Oral Nightly    buprenorphine-naloxone  1 tablet Sublingual BID    Tetanus-Diphth-Acell Pertussis  0.5 mL Intramuscular Once       Continuous Infusions:    PRN Meds:hydrOXYzine    Objective     Vitals:  Patient Vitals for the past 8 hrs:   BP Temp Temp src Pulse Resp   20 1034 116/79 98.9 °F (37.2 °C) Oral 77 17     Average, Min, and Max for last 24 hours Vitals:  TEMPERATURE:  Temp  Av.9 °F (37.2 °C)  Min: 98.8 °F (37.1 °C)  Max: 98.9 °F (37.2 °C)    RESPIRATIONS RANGE: Resp  Av  Min: 17  Max: 19    PULSE RANGE: Pulse  Av  Min: 77  Max: 95    BLOOD PRESSURE RANGE:  Systolic (48OFI), VPS:749 , Min:116 , OSI:804   ; Diastolic (79GXC), ITP:68, Min:79, Max:82      PULSE OXIMETRY RANGE: SpO2  Av %  Min: 98 %  Max: 98 %    No intake/output data recorded. CBC:  Recent Labs     20   WBC 5.9   HGB 12.1   HCT 37.0      CRP 4.8        BMP:  Recent Labs     20      K 4.0      CO2 26   BUN 14   CREATININE 0.63   GLUCOSE 95   CALCIUM 9.0        Coags:  No results for input(s): APTT, PROT, INR in the last 72 hours. Lab Results   Component Value Date    SEDRATE 21 (H) 2020     Recent Labs     20   CRP 4.8        Lower Extremity Physical Exam:  Vascular: DP and PT pulses are palpable. CFT <3 seconds to all digits. Hair growth is present to the level of the digits. Nonpitting edema present to the right foot.       Neuro: Saph/sural/SP/DP/plantar sensation intact to light touch.     Musculoskeletal: Muscle strength is 4/5 to all lower extremity muscle groups. Tenderness to palpation of third metatarsal head. Gross deformity is present, HAV deformity bilaterally. Second and third digits of right foot are diverging. Dorsal displacement of the 2nd digit appreciated with Lachman test.     Dermatologic: No open lesions noted. No erythema or lymphangitic streaking appreciated. No difference in temperature between feet. Skin is WNL. No fluctuance, crepitus, or induration. Interdigital maceration absent. Clinical Images:              Imaging:   MRI FOOT RIGHT WO CONTRAST   Final Result   1. Abnormal bone marrow signal along the medial margin of the 3rd toe   proximal phalanx and 3rd metatarsal head/distal diaphysis. Given the   clinical context, differential includes osteomyelitis; however, periarticular   involvement with lack of joint effusion is atypical for an acute   osteomyelitis. Please correlate with site of reported puncture wound. Differential includes healing fracture and avascular necrosis. 2. Subcutaneous edema/inflammation of the foot dorsum.    3. Myositis of the intrinsic foot

## 2020-07-27 PROBLEM — S92.334D CLOSED NONDISPLACED FRACTURE OF THIRD METATARSAL BONE OF RIGHT FOOT WITH ROUTINE HEALING: Status: ACTIVE | Noted: 2020-07-27

## 2020-07-27 PROBLEM — S91.331A PUNCTURE WOUND OF RIGHT FOOT: Status: ACTIVE | Noted: 2020-07-27

## 2020-07-27 PROBLEM — S92.333A: Status: ACTIVE | Noted: 2020-07-27

## 2020-08-05 ENCOUNTER — HOSPITAL ENCOUNTER (EMERGENCY)
Age: 40
Discharge: HOME | End: 2020-08-05
Attending: EMERGENCY MEDICINE
Payer: MEDICARE

## 2020-08-05 VITALS
TEMPERATURE: 97.5 F | OXYGEN SATURATION: 94 % | DIASTOLIC BLOOD PRESSURE: 69 MMHG | SYSTOLIC BLOOD PRESSURE: 121 MMHG | RESPIRATION RATE: 20 BRPM | BODY MASS INDEX: 30.82 KG/M2 | HEIGHT: 65 IN | HEART RATE: 97 BPM | WEIGHT: 185 LBS

## 2020-08-05 PROCEDURE — 99283 EMERGENCY DEPT VISIT LOW MDM: CPT

## 2020-08-05 PROCEDURE — 93971 EXTREMITY STUDY: CPT

## 2020-08-05 ASSESSMENT — PAIN DESCRIPTION - ORIENTATION: ORIENTATION: RIGHT

## 2020-08-05 ASSESSMENT — ENCOUNTER SYMPTOMS
VOMITING: 0
ABDOMINAL PAIN: 0
SHORTNESS OF BREATH: 0
COUGH: 0
NAUSEA: 0

## 2020-08-05 ASSESSMENT — PAIN DESCRIPTION - FREQUENCY: FREQUENCY: CONTINUOUS

## 2020-08-05 ASSESSMENT — PAIN DESCRIPTION - PAIN TYPE: TYPE: ACUTE PAIN

## 2020-08-05 ASSESSMENT — PAIN DESCRIPTION - DESCRIPTORS: DESCRIPTORS: ACHING;TINGLING

## 2020-08-05 ASSESSMENT — PAIN DESCRIPTION - LOCATION: LOCATION: FOOT

## 2020-08-05 ASSESSMENT — PAIN SCALES - GENERAL: PAINLEVEL_OUTOF10: 6

## 2020-08-05 NOTE — ED NOTES
Patient came to ED- states she is currently in a rehab facility- states she uses heroin and has been clean for 18 days. Patient states that on the 4th of July she stepped of a ryan pitch fork and it went through her shoe into her right foot. Patient states 911 was contacted and pt refused to go seek treatment. Patient states at end of July that the rehab facility sent her to hospital d/t swelling and increased pain in RLE/ foot. Patient states the MRI showed Osteomyelitis. Patient states she was told to follow up with podiatry. Follow up appointment is supposed to be this coming Friday- patient states that the pain has increased and the swelling has increased- rehab facility sent her to be evaluated again today. Writer asked pt if she got tetanus shot after stepping on ryan pitch fork- patient states she refused getting shot. Writer educated pt on importance of tetanus injection patient refuses.        Taj Gonsalez RN  08/05/20 5536

## 2020-08-05 NOTE — ED PROVIDER NOTES
FACULTY SIGN-OUT  ADDENDUM     Care of this patient was assumed from previous attending physician. The patient's initial evaluation and plan have been discussed with the prior provider who initially evaluated the patient. Attestation  I was available and discussed any additional care issues that arose and coordinated the management plans with the resident(s) caring for the patient during my duty period. Any areas of disagreement with resident's documentation of care or procedures are noted on the chart. I was personally present for the key portions of any/all procedures, during my duty period. I have documented in the chart those procedures where I was not present during the key portions. ED COURSE      The patient was given the following medications:  No orders of the defined types were placed in this encounter. RECENT VITALS:   Temp: 97.5 °F (36.4 °C), Pulse: 97, Resp: 20, BP: 121/69    MEDICAL DECISION MAKING        Earl Bonner is a 35 y.o. female who presents to the Emergency Department with complaints of leg pain and swelling. Venous Doppler is pending. If negative anticipate disposition of discharge to home. Tiffany Velazquez MD, F.A.C.E.P.   Attending Emergency Physician    (Please note that portions of this note were completed with a voice recognition program.  Efforts were made to edit the dictations but occasionally words are mis-transcribed.)       Nilson Mcdowell MD  08/05/20 6202

## 2020-08-05 NOTE — ED PROVIDER NOTES
[facility] ED  Emergency Department Encounter  Emergency Medicine Resident     Pt Name: Aide Du  MRN: [id number]  [place] 1980  Date of evaluation: 8/5/20  PCP:  No primary care provider on file. CHIEF COMPLAINT       Chief Complaint   Patient presents with    Leg Swelling     RLE/ foot from stepping on ryan pitch fork- 7/4    Leg Pain     RLE       HISTORY OFPRESENT ILLNESS  (Location/Symptom, Timing/Onset, Context/Setting, Quality, Duration, Modifying Factors,Severity.)      Aide Du is a 35 y.o. female who presents with complaints of right leg swelling. Patient with a past medical history of IV drug use, clean for approximately 18 days and living at a sober house, anxiety and osteomyelitis. Patient was seen evaluated on 7/25 with complaints of right foot pain. She states that 1 month prior to that she had stepped on a pitchfork while working in the garden. She was not evaluated at that time. She states that she continued to have increasing pain and an x-ray showed a possible small fracture however she sought no further treatment. She was seen by dietary on that admission and they were concerned for possible osteomyelitis. She did undergo an MRI which showed possible osteomyelitis of the third metatarsal head of the right foot. Toes were splinted and patient was placed in a surgical shoe to be weightbearing as tolerated and was to follow-up in the office on 8/7. Patient states that she came in today because the place that she is staying noticed that her right leg was swollen. She denies any new pain, redness, warmth, fevers, chest pain, shortness of breath. She denies any history of DVT/PE, recent surgery, normal replacement, hemoptysis. She has been wearing the surgical shoe to the right foot. She states that she was offered a tetanus shot but refused and again is refusing today for a tetanus shot.     PAST MEDICAL / SURGICAL / SOCIAL / FAMILY HISTORY has a past medical history of Anxiety. has no past surgical history on file. Social History     Socioeconomic History    Marital status: Single     Spouse name: Not on file    Number of children: Not on file    Years of education: Not on file    Highest education level: Not on file   Occupational History    Not on file   Social Needs    Financial resource strain: Not on file    Food insecurity     Worry: Not on file     Inability: Not on file    Transportation needs     Medical: Not on file     Non-medical: Not on file   Tobacco Use    Smoking status: Heavy Tobacco Smoker     Packs/day: 1.00     Years: 15.00     Pack years: 15.00     Types: Cigarettes    Smokeless tobacco: Never Used   Substance and Sexual Activity    Alcohol use: No    Drug use: Yes     Types: IV, Opiates      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    Sexual activity: Not on file   Lifestyle    Physical activity     Days per week: Not on file     Minutes per session: Not on file    Stress: Not on file   Relationships    Social connections     Talks on phone: Not on file     Gets together: Not on file     Attends Jehovah's witness service: Not on file     Active member of club or organization: Not on file     Attends meetings of clubs or organizations: Not on file     Relationship status: Not on file    Intimate partner violence     Fear of current or ex partner: Not on file     Emotionally abused: Not on file     Physically abused: Not on file     Forced sexual activity: Not on file   Other Topics Concern    Not on file   Social History Narrative    Not on file       History reviewed. No pertinent family history. Allergies:  Patient has no known allergies. Home Medications:  Prior to Admission medications    Medication Sig Start Date End Date Taking?  Authorizing Provider   traZODone (DESYREL) 150 MG tablet Take 150 mg by mouth 2 times daily   Yes Historical Provider, MD   gabapentin (NEURONTIN) 300 MG capsule Take 300 mg by right calf. No calf tenderness. No erythema, warmth, full range of motion. Neurovascular intact with 2+ DP and PT pulses. Sensation and strength intact. No thigh tenderness, full range of motion of the knee, hip and ankle. No tenderness to palpation. Skin:     General: Skin is warm and dry. Findings: No rash. Neurological:      Mental Status: She is alert. Mental status is at baseline. Sensory: No sensory deficit. Motor: No weakness. Psychiatric:         Behavior: Behavior normal.         Thought Content: Thought content normal.         DIFFERENTIAL  DIAGNOSIS     PLAN (LABS / IMAGING / EKG):  Orders Placed This Encounter   Procedures    VL DUP LOWER EXTREMITY VENOUS RIGHT       MEDICATIONS ORDERED:  No orders of the defined types were placed in this encounter. Initial MDM/Plan/ED COURSE:    35 y.o. female who presents with complaints of right ankle and right lower leg swelling. On exam patient is well-appearing, nontoxic. Vital signs are within normal limits. On physical exam Patient with swelling noted to her right foot, right ankle, and right calf. No calf tenderness. No erythema, warmth, full range of motion. Neurovascular intact with 2+ DP and PT pulses. Sensation and strength intact. No thigh tenderness, full range of motion of the knee, hip and ankle. No tenderness to palpation. Cardiac regular rate and rhythm. Lungs clear to auscultation bilaterally. Impression is possible DVT, will obtain ultrasound. There is no clinical evidence of infection at this time as patient's vital signs are normal there is no erythema, warmth, no new or worsening pain. ED Course as of Aug 05 1547   Wed Aug 05, 2020   1443 Patient had ultrasound completed, waiting for results. Resting comfortably in no acute distress, denies pain. [LW]   [phone] with vascular tech and states that there is no evidence of DVT in the right lower extremity.   We will plan to discharge patient home with podiatry follow-up. [LW]      ED Course User Index  [LW] Cynthia Gamble DO     Will plan to discharge patient home at this time. Patient was instructed that while resting at sleep to elevate the right leg to help with decrease swelling. She was given strict precautions including any worsening or new symptoms such as fever, redness, warmth, worsening pain or any other new or concerning symptoms. Patient instructed to keep her podiatry appointment on 8/7. Patient was also provided with PCP follow-up instructed to call next week for reevaluation establishment of care. Patient agreeable for discharge at this time, all questions answered. Patient discharged home in stable condition. DIAGNOSTIC RESULTS / EMERGENCYDEPARTMENT COURSE / MDM     LABS:  Labs Reviewed - No data to display      PROCEDURES:  None    CONSULTS:  None    CRITICAL CARE:  Please see attending note    FINAL IMPRESSION      1.  Leg swelling          DISPOSITION / PLAN     DISPOSITION Decision To Discharge 08/05/2020 03:40:08 PM      PATIENT REFERRED TO:  [facility] ED  [address] [facility] [address]  [phone]  Go to   If symptoms worsen    [address] [place], [place] [id number] [phone]  Call in 3 days        DISCHARGE MEDICATIONS:  New Prescriptions    No medications on file       Cynthia Gamble DO  Emergency Medicine Resident    (Please note that portions of this note were completed with a voice recognition program.Efforts were made to edit the dictations but occasionally words are mis-transcribed.)        Cynthia Gamble DO  Resident  08/05/20 9045

## 2020-08-05 NOTE — ED NOTES
contacted [employer]- They will leave to pick pt up at 1600.  Pt updated on plan of care     Sho Thurman RN  08/05/20 3433

## 2020-08-05 NOTE — ED PROVIDER NOTES
[facility]     Emergency Department     Faculty Attestation    I performed a history and physical examination of the patient and discussed management with the resident. I have reviewed and agree with the residents findings including all diagnostic interpretations, and treatment plans as written. Any areas of disagreement are noted on the chart. I was personally present for the key portions of any procedures. I have documented in the chart those procedures where I was not present during the key portions. I have reviewed the emergency nurses triage note. I agree with the chief complaint, past medical history, past surgical history, allergies, medications, social and family history as documented unless otherwise noted below. Documentation of the HPI, Physical Exam and Medical Decision Making performed by scribes is based on my personal performance of the HPI, PE and MDM. For Physician Assistant/ Nurse Practitioner cases/documentation I have personally evaluated this patient and have completed at least one if not all key elements of the E/M (history, physical exam, and MDM). Additional findings are as noted.         Manpreet Aggarwal D.O, M.P.H  Attending Emergency Medicine Physician        Manpreet Aggarwal DO  08/05/20 0336

## 2020-08-07 ENCOUNTER — OFFICE VISIT (OUTPATIENT)
Dept: PODIATRY | Age: 40
End: 2020-08-07
Payer: MEDICARE

## 2020-08-07 ENCOUNTER — HOSPITAL ENCOUNTER (OUTPATIENT)
Age: 40
Discharge: HOME | End: 2020-08-09
Payer: MEDICARE

## 2020-08-07 ENCOUNTER — HOSPITAL ENCOUNTER (OUTPATIENT)
Dept: GENERAL RADIOLOGY | Age: 40
Discharge: HOME | End: 2020-08-09
Payer: MEDICARE

## 2020-08-07 VITALS
HEART RATE: 91 BPM | DIASTOLIC BLOOD PRESSURE: 79 MMHG | WEIGHT: 208 LBS | BODY MASS INDEX: 34.66 KG/M2 | HEIGHT: 65 IN | SYSTOLIC BLOOD PRESSURE: 115 MMHG

## 2020-08-07 PROCEDURE — G8427 DOCREV CUR MEDS BY ELIG CLIN: HCPCS | Performed by: STUDENT IN AN ORGANIZED HEALTH CARE EDUCATION/TRAINING PROGRAM

## 2020-08-07 PROCEDURE — 4004F PT TOBACCO SCREEN RCVD TLK: CPT | Performed by: STUDENT IN AN ORGANIZED HEALTH CARE EDUCATION/TRAINING PROGRAM

## 2020-08-07 PROCEDURE — G8417 CALC BMI ABV UP PARAM F/U: HCPCS | Performed by: STUDENT IN AN ORGANIZED HEALTH CARE EDUCATION/TRAINING PROGRAM

## 2020-08-07 PROCEDURE — 73630 X-RAY EXAM OF FOOT: CPT

## 2020-08-07 PROCEDURE — 99202 OFFICE O/P NEW SF 15 MIN: CPT | Performed by: STUDENT IN AN ORGANIZED HEALTH CARE EDUCATION/TRAINING PROGRAM

## 2020-08-07 PROCEDURE — 99203 OFFICE O/P NEW LOW 30 MIN: CPT | Performed by: STUDENT IN AN ORGANIZED HEALTH CARE EDUCATION/TRAINING PROGRAM

## 2020-08-07 NOTE — PROGRESS NOTES
[facility]  [address] [place], [place] [address]  Tel: [phone]   Fax: [phone]    Subjective     CC: Puncture wound right foot    HPI:  Jordan Ward is a 35y.o. year old female who presents to clinic today for puncture wounds of right foot. Patient reports that a couple of days before 4 July she was gardening and stepped on a pitchfork and sustained 3 puncture wounds to the bottom of her right foot. At this time she describes an extreme amount of swelling in her right foot. She was then seen at [facility] who reported she had a fracture. Patient went to the ER on July 25 to follow-up after receiving the fracture diagnosis. At this appointment in the [facility] ER and x-ray was taken that showed possible fracture versus osteomyelitis versus avascular necrosis. She was given a postop shoe and told to cherelle tape until her podiatry clinic visit. Today she reports that the wounds are completely healed but that her right foot and leg still swell progressively throughout the day. She also relates that she is currently staying in the [address] [place] [address],[facility] for recovering addict and does not want to be admitted to the hospital for any reason. Patient states that she smokes 8 cigarettes/day. Patient had venous duplex of lower extremity on the right which was negative for DVT. No other complaints at this time. ROS:    Constitutional: Denies nausea, vomiting, fever, chills. Neurologic: Denies numbness, tingling, and burning in the feet. Vascular: Denies symptoms of lower extremity claudication. Skin: Denies open wounds. Otherwise negative except as noted in the HPI. PMH:  Past Medical History:   Diagnosis Date    Anxiety        Surgical History:   No past surgical history on file.     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 Authorizing Provider   traZODone (DESYREL) 150 MG tablet Take 150 mg by mouth 2 times daily   Yes Historical Provider, MD   gabapentin (NEURONTIN) 300 MG capsule Take 300 mg by mouth 3 times daily. Yes Historical Provider, MD   hydrOXYzine (VISTARIL) 25 MG capsule Take 25 mg by mouth 3 times daily as needed for Itching   Yes Historical Provider, MD   cloNIDine (CATAPRES) 0.2 MG tablet Take 0.2 mg by mouth 2 times daily   Yes Historical Provider, MD   buprenorphine-naloxone (SUBOXONE) 4-1 MG FILM SL film Place 1 Film under the tongue daily. Lawanda Gutierrez Historical Provider, MD       Objective     Vitals:    08/07/20 1243   BP: 115/79   Pulse: 91       No results found for: LABA1C    Physical Exam:  General:  Alert and oriented x3. In no acute distress. Lower Extremity Physical Exam:    Vascular: DP and PT pulses are palpable, Bilateral. CFT <3 seconds to all digits, Bilateral.  Mild edema, right lower extremity. Hair growth is absent to the level of the digits, Bilateral.     Neuro: Saph/sural/SP/DP/plantar sensation intact to light touch. Protective sensation is intact to 10/10 sites as tested with a 5.07g SWMF, Bilateral.     Musculoskeletal: EHL/FHL/GS/TA gross motor intact. Tenderness to palpation of right 3rd digit and met head. Gross deformity of hallux abducto valgus is present, Bilateral.     Dermatologic: No open lesions, Bilateral.  Interdigital maceration absent, Bilateral.  Nails 1-10 are normotrophic.       Biomechanical Exam:    Right foot: 1st MTPJ: Loaded:45 Unloaded:55  Right foot: 1st Ray: +3, -1, Dorsiflexed      Right foot: Ankle DF knee extended: 5  Right foot: Ankle DF knee flexed: 8    Left foot: 1st MTPJ: Loaded: 45 Unloaded: 55  Left foot: 1st Ray: +3, -1, Dorsiflexed  Left foot: Ankle DF knee extended: 5  Left foot: Ankle DF knee flexed: 8    Gait Analysis: Antalgic, early heel lift    Imaging:     MRI right foot: 7/26/20  Impression    1. Abnormal bone marrow signal along the medial margin of the 3rd toe    proximal phalanx and 3rd metatarsal head/distal diaphysis.  Given the    clinical context, differential includes osteomyelitis; however, periarticular    involvement with lack of joint effusion is atypical for an acute    osteomyelitis.  Please correlate with site of reported puncture wound. Differential includes healing fracture and avascular necrosis. 2. Subcutaneous edema/inflammation of the foot dorsum. 3. Myositis of the intrinsic foot musculature around the 3rd metatarsal.      Xray right foot, 7/25/20  Impression    Erosions 3rd metatarsal phalangeal joint suspicious for infection. Assessment   Erin Ching is a 35 y.o. female with     Diagnosis Orders   1. Closed displaced fracture of proximal phalanx of lesser toe of right foot, initial encounter  XR FOOT RIGHT (MIN 3 VIEWS)    CBC with Differential    C-Reactive Protein    Sedimentation Rate    XR FOOT RIGHT (MIN 3 VIEWS)        Plan   · Patient examined and evaluated  · Diagnosis and treatment options discussed in detail  · X-rays reviewed and discussed with patient, all questions and concerns answered--At this point treating for fracture, no sign of infection. · Patient to continue wearing postop shoe on right foot and cherelle taping. · Ordered x-rays for patient to get prior to next visit. · Order CBC with differential, CRP, ESR to get before next visit. · Encouraged patient to discontinue smoking as it can impede bone healing. · Patient to RTC in 2 weeks. · This case was discussed and reviewed with Dr. Elisa Ferro. · Please, call the office with any questions or concerns     No orders of the defined types were placed in this encounter.     Orders Placed This Encounter Procedures    XR FOOT RIGHT (MIN 3 VIEWS)     Weight bearing     Scheduling Instructions:      Weight bearing     Order Specific Question:   Reason for exam:     Answer:   Fracture evaluation    XR FOOT RIGHT (MIN 3 VIEWS)     Standing Status:   Future     Standing Expiration Date:   8/7/2021     Order Specific Question:   Reason for exam:     Answer:   assess 3rd met for osseus changes    CBC with Differential    C-Reactive Protein     Standing Status:   Future     Standing Expiration Date:   8/7/2021    Sedimentation Rate     Standing Status:   Future     Standing Expiration Date:   8/7/2021       Layton Lanes, DPM   Podiatric Medicine & Surgery   8/7/2020 at 4:54 PM

## 2020-08-07 NOTE — PROGRESS NOTES
Patient instructed to remove shoes and socks, instructed to sit in exam chair. Current PCP name is NA patient states she does not have a PCP at this time. and date of last visit NA.  Do you have a follow up visit scheduled?   no    If yes the date is

## 2020-08-12 ENCOUNTER — HOSPITAL ENCOUNTER (OUTPATIENT)
Age: 40
Discharge: HOME | End: 2020-08-12
Payer: MEDICARE

## 2020-08-12 ENCOUNTER — HOSPITAL ENCOUNTER (OUTPATIENT)
Dept: GENERAL RADIOLOGY | Age: 40
Discharge: HOME | End: 2020-08-14
Payer: MEDICARE

## 2020-08-12 ENCOUNTER — HOSPITAL ENCOUNTER (OUTPATIENT)
Age: 40
Discharge: HOME | End: 2020-08-14
Payer: MEDICARE

## 2020-08-12 LAB
C-REACTIVE PROTEIN: 4.2 MG/L (ref 0–5)
SEDIMENTATION RATE, ERYTHROCYTE: 26 MM (ref 0–20)

## 2020-08-12 PROCEDURE — 36415 COLL VENOUS BLD VENIPUNCTURE: CPT

## 2020-08-12 PROCEDURE — 85652 RBC SED RATE AUTOMATED: CPT

## 2020-08-12 PROCEDURE — 86140 C-REACTIVE PROTEIN: CPT

## 2020-08-12 PROCEDURE — 73630 X-RAY EXAM OF FOOT: CPT

## 2020-08-18 ENCOUNTER — HOSPITAL ENCOUNTER (EMERGENCY)
Age: 40
Discharge: HOME | End: 2020-08-18
Attending: EMERGENCY MEDICINE
Payer: MEDICARE

## 2020-08-18 VITALS
BODY MASS INDEX: 34.66 KG/M2 | DIASTOLIC BLOOD PRESSURE: 87 MMHG | SYSTOLIC BLOOD PRESSURE: 127 MMHG | HEIGHT: 65 IN | WEIGHT: 208 LBS | HEART RATE: 94 BPM | OXYGEN SATURATION: 100 % | TEMPERATURE: 99.2 F | RESPIRATION RATE: 17 BRPM

## 2020-08-18 LAB
-: NORMAL
AMORPHOUS: NORMAL
BACTERIA: NORMAL
BILIRUBIN URINE: NEGATIVE
CASTS UA: NORMAL /LPF (ref 0–8)
COLOR: YELLOW
COMMENT UA: ABNORMAL
CRYSTALS, UA: NORMAL /HPF
DIRECT EXAM: ABNORMAL
EPITHELIAL CELLS UA: NORMAL /HPF (ref 0–5)
GLUCOSE URINE: NEGATIVE
HCG(URINE) PREGNANCY TEST: NEGATIVE
KETONES, URINE: NEGATIVE
LEUKOCYTE ESTERASE, URINE: ABNORMAL
Lab: ABNORMAL
MUCUS: NORMAL
NITRITE, URINE: NEGATIVE
OTHER OBSERVATIONS UA: NORMAL
PH UA: 5 (ref 5–8)
PROTEIN UA: NEGATIVE
RBC UA: NORMAL /HPF (ref 0–4)
RENAL EPITHELIAL, UA: NORMAL /HPF
SPECIFIC GRAVITY UA: 1.02 (ref 1–1.03)
SPECIMEN DESCRIPTION: ABNORMAL
TRICHOMONAS: NORMAL
TURBIDITY: CLEAR
URINE HGB: ABNORMAL
UROBILINOGEN, URINE: NORMAL
WBC UA: NORMAL /HPF (ref 0–5)
YEAST: NORMAL

## 2020-08-18 PROCEDURE — 87086 URINE CULTURE/COLONY COUNT: CPT

## 2020-08-18 PROCEDURE — 87591 N.GONORRHOEAE DNA AMP PROB: CPT

## 2020-08-18 PROCEDURE — 6370000000 HC RX 637 (ALT 250 FOR IP): Performed by: STUDENT IN AN ORGANIZED HEALTH CARE EDUCATION/TRAINING PROGRAM

## 2020-08-18 PROCEDURE — 87660 TRICHOMONAS VAGIN DIR PROBE: CPT

## 2020-08-18 PROCEDURE — 99283 EMERGENCY DEPT VISIT LOW MDM: CPT

## 2020-08-18 PROCEDURE — 96372 THER/PROPH/DIAG INJ SC/IM: CPT

## 2020-08-18 PROCEDURE — 87491 CHLMYD TRACH DNA AMP PROBE: CPT

## 2020-08-18 PROCEDURE — 81001 URINALYSIS AUTO W/SCOPE: CPT

## 2020-08-18 PROCEDURE — 87510 GARDNER VAG DNA DIR PROBE: CPT

## 2020-08-18 PROCEDURE — 81025 URINE PREGNANCY TEST: CPT

## 2020-08-18 PROCEDURE — 6360000002 HC RX W HCPCS: Performed by: STUDENT IN AN ORGANIZED HEALTH CARE EDUCATION/TRAINING PROGRAM

## 2020-08-18 PROCEDURE — 87480 CANDIDA DNA DIR PROBE: CPT

## 2020-08-18 RX ORDER — AZITHROMYCIN 250 MG/1
1000 TABLET, FILM COATED ORAL ONCE
Status: COMPLETED | OUTPATIENT
Start: 2020-08-18 | End: 2020-08-18

## 2020-08-18 RX ORDER — METRONIDAZOLE 500 MG/1
500 TABLET ORAL 2 TIMES DAILY
Qty: 14 TABLET | Refills: 0 | Status: SHIPPED | OUTPATIENT
Start: 2020-08-18 | End: 2020-08-25

## 2020-08-18 RX ORDER — FLUCONAZOLE 150 MG/1
150 TABLET ORAL ONCE
Qty: 1 TABLET | Refills: 0 | Status: SHIPPED | OUTPATIENT
Start: 2020-08-18 | End: 2020-08-18

## 2020-08-18 RX ORDER — CEFTRIAXONE 1 G/1
1 INJECTION, POWDER, FOR SOLUTION INTRAMUSCULAR; INTRAVENOUS ONCE
Status: COMPLETED | OUTPATIENT
Start: 2020-08-18 | End: 2020-08-18

## 2020-08-18 RX ORDER — CEPHALEXIN 500 MG/1
500 CAPSULE ORAL 4 TIMES DAILY
Qty: 28 CAPSULE | Refills: 0 | Status: SHIPPED | OUTPATIENT
Start: 2020-08-18 | End: 2020-08-25

## 2020-08-18 RX ADMIN — CEFTRIAXONE SODIUM 1 G: 1 INJECTION, POWDER, FOR SOLUTION INTRAMUSCULAR; INTRAVENOUS at 11:48

## 2020-08-18 RX ADMIN — AZITHROMYCIN 1000 MG: 250 TABLET, FILM COATED ORAL at 11:48

## 2020-08-18 ASSESSMENT — PAIN DESCRIPTION - LOCATION: LOCATION: FLANK

## 2020-08-18 ASSESSMENT — PAIN DESCRIPTION - ORIENTATION: ORIENTATION: LEFT

## 2020-08-18 ASSESSMENT — PAIN SCALES - GENERAL: PAINLEVEL_OUTOF10: 7

## 2020-08-18 ASSESSMENT — PAIN DESCRIPTION - PAIN TYPE: TYPE: ACUTE PAIN

## 2020-08-18 NOTE — ED NOTES
Pt resting on stretcher, talking on phone. Pt denies needs at this time.    Call light within reach, will continue to monitor      Jyoti Camara RN  08/18/20 5499

## 2020-08-18 NOTE — ED PROVIDER NOTES
[facility] ED  Emergency Department Encounter  Emergency Medicine Resident     Pt Name: Elsa Cannon  MRN: [id number]  [place] 1980  Date of evaluation: 8/18/20  PCP:  No primary care provider on file. Chief Complaint     Chief Complaint   Patient presents with    Vaginal Discharge       HISTORY OF PRESENT ILLNESS     Elsa Cannon is a 35 y.o. female who presented to the emergency department with concern for vaginal discharge of approximately 6 days duration as well as intermittent periods and spotting. The patient denies fevers, chills, nausea, vomiting, or diarrhea. Patient endorses no urinary symptoms however does endorse suprapubic cramping as well as sporadic back pain with the symptoms. Patient does endorse prior high risk sexual activity, states that she has the Nexplanon for contraception. REVIEW OF SYSTEMS       Constitutional: Denies recent fever, chills. Eyes: No visual changes. Neck: No midline neck pain  Respiratory: Denies recent shortness of breath. Cardiac:  Denies recent chest pain. GI: denies any recent abdominal pain nausea or vomiting. Denies Blood in the stool or black tarry stools. : Denies dysuria. Endorses vaginal bleeding and vaginal discharge  Musculoskeletal: Denies focal weakness. Neurologic: denies headache or focal weakness. Skin:  Denies any rash. PAST MEDICAL/SURGICAL/FAMILY HISTORY     Past Medical Hx:   Patient has no medical problems   has a past medical history of Anxiety. Past Surgical Hx:  Patient has had no surgeries   has no past surgical history on file.     Social Hx:  Social History     Socioeconomic History    Marital status: Single     Spouse name: Not on file    Number of children: Not on file    Years of education: Not on file    Highest education level: Not on file   Occupational History    Not on file   Social Needs    Financial resource strain: Not on file    Food insecurity     Worry: Not on file     Inability: Not on file    Transportation needs     Medical: Not on file     Non-medical: Not on file   Tobacco Use    Smoking status: Heavy Tobacco Smoker     Packs/day: 1.00     Years: 15.00     Pack years: 15.00     Types: Cigarettes    Smokeless tobacco: Never Used   Substance and Sexual Activity    Alcohol use: No    Drug use: Yes     Types: IV, Opiates      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    Sexual activity: Not on file   Lifestyle    Physical activity     Days per week: Not on file     Minutes per session: Not on file    Stress: Not on file   Relationships    Social connections     Talks on phone: Not on file     Gets together: Not on file     Attends Hoahaoism service: Not on file     Active member of club or organization: Not on file     Attends meetings of clubs or organizations: Not on file     Relationship status: Not on file    Intimate partner violence     Fear of current or ex partner: Not on file     Emotionally abused: Not on file     Physically abused: Not on file     Forced sexual activity: Not on file   Other Topics Concern    Not on file   Social History Narrative    Not on file       Family Hx:  No relevant family history is reported  History reviewed. No pertinent family history. Allergies:    No known medication allergies  Patient has no known allergies. Home Medications: The patient takes no medications  Prior to Admission medications    Medication Sig Start Date End Date Taking?  Authorizing Provider   metroNIDAZOLE (FLAGYL) 500 MG tablet Take 1 tablet by mouth 2 times daily for 7 days 8/18/20 8/25/20 Yes Goldie Parks MD   cephALEXin (KEFLEX) 500 MG capsule Take 1 capsule by mouth 4 times daily for 7 days 8/18/20 8/25/20 Yes Goldie Parks MD   fluconazole (DIFLUCAN) 150 MG tablet Take 1 tablet by mouth once for 1 dose 8/18/20 8/18/20 Yes Goldie Parks MD   traZODone (DESYREL) 150 MG tablet Take 150 mg by mouth 2 times daily    Historical Provider, MD   gabapentin (NEURONTIN) 300 MG capsule Take 300 mg by mouth 3 times daily. Historical Provider, MD   hydrOXYzine (VISTARIL) 25 MG capsule Take 25 mg by mouth 3 times daily as needed for Itching    Historical Provider, MD   cloNIDine (CATAPRES) 0.2 MG tablet Take 0.2 mg by mouth 2 times daily    Historical Provider, MD   buprenorphine-naloxone (SUBOXONE) 4-1 MG FILM SL film Place 1 Film under the tongue daily. Meek Santana Historical Provider, MD       PHYSICAL EXAM       /87   Pulse 94   Temp 99.2 °F (37.3 °C) (Oral)   Resp 17   Ht 5' 5\" (1.651 m)   Wt 208 lb (94.3 kg)   LMP 12/18/2019 (Approximate)   SpO2 100%   BMI 34.61 kg/m²     CONSTITUTIONAL: Vital signs reviewed, Alert and oriented X 3. HEAD: Atraumatic, Normocephalic. EYES: Eyes are normal to inspection, Pupils equal, round and reactive to light. NECK: Normal ROM, No jugular venous distention, No meningeal signs. RESPIRATORY CHEST: No respiratory distress. ABDOMEN: Abdomen is nontender, No distension. No pulsatile masses palpated. :  Pelvic Exam completed, see notes below in the procedure section for details  BACK:  No midline bony tenderness to palpation. UPPER EXTREMITY: Inspection normal, No cyanosis. LOWER EXTREMITY: Pulses are 2+ and equal bilaterally with cap refill < 2 seconds. NEURO: GCS is 15.  5/5 strength in bilateral lower extremities with sensation to light touch intact. SKIN: Skin is warm, Skin is dry. PSYCHIATRIC: Oriented X 3, Normal affect. Diagnostic Results     LABS:  Not indicated  Results for orders placed or performed during the hospital encounter of 08/18/20   VAGINITIS DNA PROBE    Specimen: Vaginal   Result Value Ref Range    Specimen Description . VAGINA     Special Requests NOT REPORTED     Direct Exam POSITIVE for Gardnerella vaginalis. (A)     Direct Exam POSITIVE for Trichomonas vaginalis (A)     Direct Exam NEGATIVE for Candida sp.      Direct Exam       Method of testing is to the hospital to receive the results of the testing. I discussed the need for concurrent testing of other STI including, but not limited to, HIV and syphillis. Discussed safe sex practices and provided education on prophylactic (including condoms) use. Patient noted to have trichomonas, Gardnerella, as well as acute cystitis with hematuria secondary to leukocyte esterase as well as white blood cell count, patient also given Diflucan with concerns for yeast infection following antibiotic use. Patient cleared for discharge. Procedures     PELVIC EXAM:  VULVA: normal appearing vulva with no masses, tenderness or lesions, VAGINA: normal appearing vagina with normal color and discharge, no lesions, CERVIX: friable, erythematous with cervical discharge present - white and mucoid, UTERUS: uterus is normal size, shape, consistency and nontender, ADNEXA: normal adnexa in size, mild tenderness of the left adnexa. Final impression      1. Trichomonas vaginalis (TV) infection    2. Gardnerella associated vaginal discharge    3.  Acute cystitis with hematuria           Disposition with plan     Disposition: Home    PATIENT REFERRED TO:  [facility]  [address] [facility] [id number] [phone]    As needed      DISCHARGE MEDICATIONS:  Discharge Medication List as of 8/18/2020 12:58 PM      START taking these medications    Details   metroNIDAZOLE (FLAGYL) 500 MG tablet Take 1 tablet by mouth 2 times daily for 7 days, Disp-14 tablet,R-0Print      cephALEXin (KEFLEX) 500 MG capsule Take 1 capsule by mouth 4 times daily for 7 days, Disp-28 capsule,R-0Print               Mick Cornell MD  Emergency Medicine Resident    (Please note that portions of this note were completed with a voice recognition program.  Efforts were made to edit the dictations but occasionally words are mis-transcribed.)       Rj Gordon MD  Resident  08/18/20 8366

## 2020-08-18 NOTE — ED NOTES
Pt to the ED c/o vaginal discharge along with left flank pain. Pt reports that she has been having thick white discharge x 2 days. Pt also reports 2 days of left flank pain. Pt reports that she has been urinating per normal, denies hematuria or dysuria. Pt denies taking anything for pain today PTA. Pt reports that she got in contact with an OB, but isn't able to be seen until 9/28. Pt wanted to come to the ED to be evaluated today because she doesn't want to keep waiting for OB appointment. Pt reports that she has also been having abnormal menstrual cycles since December 2019. Pt states that she is an heroin addict, but states that she has been clean now for over a month. Pt states that when she was using she did not keep track of her health but now that she is clean wants to get back on track and on top of everything.       Rosanne Billingsley, RN  08/18/20 898 [facility], RN  08/18/20 9685

## 2020-08-18 NOTE — ED PROVIDER NOTES
[facility]     Emergency Department     Faculty Attestation    I performed a history and physical examination of the patient and discussed management with the resident. I have reviewed and agree with the residents findings including all diagnostic interpretations, and treatment plans as written at the time of my review. Any areas of disagreement are noted on the chart. I was personally present for the key portions of any procedures. I have documented in the chart those procedures where I was not present during the key portions. For Physician Assistant/ Nurse Practitioner cases/documentation I have personally evaluated this patient and have completed at least one if not all key elements of the E/M (history, physical exam, and MDM). Additional findings are as noted. This patient was evaluated in the Emergency Department for symptoms described in the history of present illness. The patient was evaluated in the context of the global COVID-19 pandemic, which necessitated consideration that the patient might be at risk for infection with the SARS-CoV-2 virus that causes COVID-19. Institutional protocols and algorithms that pertain to the evaluation of patients at risk for COVID-19 are in a state of rapid change based on information released by regulatory bodies including the CDC and federal and state organizations. These policies and algorithms were followed during the patient's care in the ED. Primary Care Physician: No primary care provider on file. History: This is a 35 y.o. female who presents to the Emergency Department with complaint of vaginal discharge. Patient states been ongoing for at least a month. 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 She does complain of left-sided abdominal pain. Physical:   height is 5' 5\" (1.651 m) and weight is 208 lb (94.3 kg).  Her oral temperature is 99.2 °F (37.3 °C). Her blood pressure is 127/87 and her pulse is 94. Her respiration is 17 and oxygen saturation is 100%. Abdomen is soft there is no tenderness no guarding or rebound on examination, pelvic exam performed by the resident. Impression: Vaginal discharge    Plan: Pelvic labs      (Please note that portions of this note were completed with a voice recognition program.  Efforts were made to edit the dictations but occasionally words are mis-transcribed.)    [facility].  Danyelle Nicole MD, [facility]  Attending Emergency Medicine Physician        Patrick Malik MD  08/18/20 1211

## 2020-08-19 LAB
C TRACH DNA GENITAL QL NAA+PROBE: NEGATIVE
CULTURE: NORMAL
Lab: NORMAL
N. GONORRHOEAE DNA: NEGATIVE
SPECIMEN DESCRIPTION: NORMAL
SPECIMEN DESCRIPTION: NORMAL

## 2020-08-21 ENCOUNTER — HOSPITAL ENCOUNTER (OUTPATIENT)
Dept: GENERAL RADIOLOGY | Age: 40
Discharge: HOME | End: 2020-08-23
Payer: MEDICARE

## 2020-08-21 ENCOUNTER — OFFICE VISIT (OUTPATIENT)
Dept: PODIATRY | Age: 40
End: 2020-08-21
Payer: MEDICARE

## 2020-08-21 ENCOUNTER — HOSPITAL ENCOUNTER (OUTPATIENT)
Age: 40
Discharge: HOME | End: 2020-08-23
Payer: MEDICARE

## 2020-08-21 VITALS
SYSTOLIC BLOOD PRESSURE: 130 MMHG | WEIGHT: 212 LBS | BODY MASS INDEX: 35.32 KG/M2 | HEIGHT: 65 IN | HEART RATE: 84 BPM | DIASTOLIC BLOOD PRESSURE: 80 MMHG

## 2020-08-21 PROCEDURE — 73630 X-RAY EXAM OF FOOT: CPT

## 2020-08-21 PROCEDURE — G8417 CALC BMI ABV UP PARAM F/U: HCPCS | Performed by: STUDENT IN AN ORGANIZED HEALTH CARE EDUCATION/TRAINING PROGRAM

## 2020-08-21 PROCEDURE — 99213 OFFICE O/P EST LOW 20 MIN: CPT | Performed by: STUDENT IN AN ORGANIZED HEALTH CARE EDUCATION/TRAINING PROGRAM

## 2020-08-21 PROCEDURE — G8427 DOCREV CUR MEDS BY ELIG CLIN: HCPCS | Performed by: STUDENT IN AN ORGANIZED HEALTH CARE EDUCATION/TRAINING PROGRAM

## 2020-08-21 PROCEDURE — 99212 OFFICE O/P EST SF 10 MIN: CPT | Performed by: STUDENT IN AN ORGANIZED HEALTH CARE EDUCATION/TRAINING PROGRAM

## 2020-08-21 PROCEDURE — 4004F PT TOBACCO SCREEN RCVD TLK: CPT | Performed by: STUDENT IN AN ORGANIZED HEALTH CARE EDUCATION/TRAINING PROGRAM

## 2020-08-21 RX ORDER — IBUPROFEN 800 MG/1
800 TABLET ORAL EVERY 8 HOURS PRN
Qty: 63 TABLET | Refills: 0 | Status: SHIPPED | OUTPATIENT
Start: 2020-08-21 | End: 2020-10-07

## 2020-08-21 NOTE — PROGRESS NOTES
[facility]  [address] [place] [address] [address] [place] , [address]  Tel: [phone]   Fax: [phone]    Subjective     CC: Puncture wound right foot    Interval hx:  Notes she has been on her feet more often due to work at current living facility. Notes swelling to R foot increased. Pain remains same as initial injury. Has not taken any pain medications, elevated RLE, iced behind knee. WB in surgical shoe, notes swelling to R foot worsened and some tenderness to R ankle. Reported to ED on 8/5/20 for RLE pain and swelling - Venous US neg. Continues to smoke 8 cigarettes/day. No new falls or trauma to right foot. HPI:  Marlys Delacruz is a 35y.o. year old female who presents to clinic today for puncture wounds of right foot. Patient reports that a couple of days before 4 July she was gardening and stepped on a pitchfork and sustained 3 puncture wounds to the bottom of her right foot. At this time she describes an extreme amount of swelling in her right foot. She was then seen at [facility] who reported she had a fracture. Patient went to the ER on July 25 to follow-up after receiving the fracture diagnosis. At this appointment in the [facility] ER and x-ray was taken that showed possible fracture versus osteomyelitis versus avascular necrosis. She was given a postop shoe and told to cherelle tape until her podiatry clinic visit. Today she reports that the wounds are completely healed but that her right foot and leg still swell progressively throughout the day. She also relates that she is currently staying in the [address] [place] [address],[address] for recovering addict and does not want to be admitted to the hospital for any reason. Patient states that she smokes 8 cigarettes/day. Patient had venous duplex of lower extremity on the right which was negative for DVT. No other complaints at this time. ROS:    Constitutional: Denies nausea, vomiting, fever, chills.   Neurologic: Denies numbness, tingling, and burning in the feet. Vascular: Denies symptoms of lower extremity claudication. Skin: Denies open wounds. Otherwise negative except as noted in the HPI. PMH:  Past Medical History:   Diagnosis Date    Anxiety        Surgical History:   No past surgical history on file. 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 Authorizing Provider   ibuprofen (ADVIL;MOTRIN) 800 MG tablet Take 1 tablet by mouth every 8 hours as needed for Pain 8/21/20 9/11/20 Yes Corey Cottrell DPM   metroNIDAZOLE (FLAGYL) 500 MG tablet Take 1 tablet by mouth 2 times daily for 7 days 8/18/20 8/25/20 Yes Mara Purcell MD   cephALEXin (KEFLEX) 500 MG capsule Take 1 capsule by mouth 4 times daily for 7 days 8/18/20 8/25/20 Yes Mara Purcell MD   traZODone (DESYREL) 150 MG tablet Take 150 mg by mouth 2 times daily   Yes Historical Provider, MD   gabapentin (NEURONTIN) 300 MG capsule Take 300 mg by mouth 3 times daily. Yes Historical Provider, MD   hydrOXYzine (VISTARIL) 25 MG capsule Take 25 mg by mouth 3 times daily as needed for Itching   Yes Historical Provider, MD   cloNIDine (CATAPRES) 0.2 MG tablet Take 0.2 mg by mouth 2 times daily   Yes Historical Provider, MD   buprenorphine-naloxone (SUBOXONE) 4-1 MG FILM SL film Place 1 Film under the tongue daily. Jazz Hong Historical Provider, MD       Objective     Vitals:    08/21/20 1339   BP: 130/80   Pulse: 84       No results found for: LABA1C    Physical Exam:  General:  Alert and oriented x3. In no acute distress.      Lower Extremity Physical Exam:    Vascular: DP and PT pulses are palpable, Bilateral. CFT <3 seconds to all digits, Bilateral.  Mild edema, right lower extremity. Hair growth is absent to the level of the digits, Bilateral.     Neuro: Saph/sural/SP/DP/plantar sensation intact to light touch. Protective sensation is intact to 10/10 sites as tested with a 5.07g SWMF, Bilateral.     Musculoskeletal: EHL/FHL/GS/TA gross motor intact. Tenderness to palpation of right medial prox phalanx 3rd digit and plantar 3rd met head. Gross deformity of hallux abducto valgus is present, Bilateral.     Dermatologic: No open lesions, Bilateral.  Interdigital maceration absent, Bilateral.  Nails 1-10 are normotrophic. MRI right foot: 7/26/20  Impression    1. Abnormal bone marrow signal along the medial margin of the 3rd toe    proximal phalanx and 3rd metatarsal head/distal diaphysis.  Given the    clinical context, differential includes osteomyelitis; however, periarticular    involvement with lack of joint effusion is atypical for an acute    osteomyelitis.  Please correlate with site of reported puncture wound. Differential includes healing fracture and avascular necrosis. 2. Subcutaneous edema/inflammation of the foot dorsum. 3. Myositis of the intrinsic foot musculature around the 3rd metatarsal.      Xray right foot, 7/25/20  Impression    Erosions 3rd metatarsal phalangeal joint suspicious for infection. Assessment   Vilma Stephens is a 35 y.o. female with     Diagnosis Orders   1. Edema of lower extremity  ibuprofen (ADVIL;MOTRIN) 800 MG tablet   2.  Closed displaced fracture of proximal phalanx of lesser toe of right foot, initial encounter  ibuprofen (ADVIL;MOTRIN) 800 MG tablet        Plan   · Patient examined and evaluated  · Diagnosis and treatment options discussed in detail  · XR 8/21 reviewed - no interval changes from 2 wks prior   · Educated on smoking cessation  · CAM boot dispensed  · Work note to avoid standing/wb RLE  · Tubigrip/ace dispensed to RLE  · rx ibuprofen  · RICE RLE  · Low suspicion for infectious process to R foot. Educated pt on signs of DVT and to report to ED if arise. · Patient to RTC in 2 weeks for reeval.  · Discussed and reviewed with Dr. Espinoza Augustine. · Please, call the office with any questions or concerns     Orders Placed This Encounter   Medications    ibuprofen (ADVIL;MOTRIN) 800 MG tablet     Sig: Take 1 tablet by mouth every 8 hours as needed for Pain     Dispense:  63 tablet     Refill:  0     No orders of the defined types were placed in this encounter. Patient was ordered CAM boot in order to stabilize and support the foot.      Deann Rolle DPM   Podiatric Medicine & Surgery   8/21/2020 at 2:19 PM

## 2020-08-21 NOTE — PROGRESS NOTES
Patient instructed to remove shoes and socks, instructed to sit in exam chair. Current PCP name is NONE and date of last visit NA. Do you have a follow up visit scheduled?   Yes or no    If yes the date is NO  Patient states she has new pcp, cant remember name and has first appointment 9-13-20

## 2020-08-21 NOTE — LETTER
[facility]  [address]  [facility] [address] [address]  Phone: [phone]  Fax: [phone]         August 21, 2020     Patient: Dereje Tripathi   YOB: 1980   Date of Visit: 8/21/2020       To Whom It May Concern:    Kayley Serna was seen and treated in our clinic on 8/21/2020. The following work duties are recommended. She may return to work on 8/24/20 with the following restrictions: limit WB Right lower extremity. No work on feet, okay for desk or sitting work     Treatment and work recommendations given by the emergency department are initial emergency measures only, and follow up should be arranged as soon as possible with the company's occupational health provider* or the specialist to whom the worker was referred. *If the company does not have an occupational health provider, follow up should be at No follow-up provider specified.         Sincerely,        Yaneth Soler DPM        Signature:__________________________________

## 2020-08-27 ENCOUNTER — TELEPHONE (OUTPATIENT)
Dept: PODIATRY | Age: 40
End: 2020-08-27

## 2020-08-27 NOTE — TELEPHONE ENCOUNTER
Pt called yesterday and lm with nicanor that her work note had the wrong dates on it.     Called pt and lm to call me back with the correct dates so I can make a new letter and fax it to [phone] per pts request.

## 2020-09-04 ENCOUNTER — OFFICE VISIT (OUTPATIENT)
Dept: PODIATRY | Age: 40
End: 2020-09-04
Payer: MEDICARE

## 2020-09-04 ENCOUNTER — HOSPITAL ENCOUNTER (OUTPATIENT)
Dept: VASCULAR LAB | Age: 40
Discharge: HOME | End: 2020-09-04
Payer: MEDICARE

## 2020-09-04 VITALS
SYSTOLIC BLOOD PRESSURE: 112 MMHG | HEART RATE: 68 BPM | TEMPERATURE: 97 F | BODY MASS INDEX: 36.11 KG/M2 | DIASTOLIC BLOOD PRESSURE: 69 MMHG | WEIGHT: 217 LBS

## 2020-09-04 PROCEDURE — G8417 CALC BMI ABV UP PARAM F/U: HCPCS | Performed by: PODIATRIST

## 2020-09-04 PROCEDURE — G8427 DOCREV CUR MEDS BY ELIG CLIN: HCPCS | Performed by: PODIATRIST

## 2020-09-04 PROCEDURE — 93970 EXTREMITY STUDY: CPT

## 2020-09-04 PROCEDURE — 99213 OFFICE O/P EST LOW 20 MIN: CPT | Performed by: PODIATRIST

## 2020-09-04 PROCEDURE — 4004F PT TOBACCO SCREEN RCVD TLK: CPT | Performed by: PODIATRIST

## 2020-09-04 RX ORDER — CLONIDINE HYDROCHLORIDE 0.1 MG/1
TABLET ORAL
COMMUNITY
Start: 2020-08-31

## 2020-09-04 RX ORDER — HYDROXYZINE 50 MG/1
TABLET, FILM COATED ORAL
COMMUNITY
Start: 2020-08-27

## 2020-09-04 RX ORDER — ESCITALOPRAM OXALATE 10 MG/1
TABLET ORAL
COMMUNITY
Start: 2020-08-27

## 2020-09-04 NOTE — PROGRESS NOTES
venous duplex of lower extremity on the right which was negative for DVT. No other complaints at this time. ROS:    Constitutional: Denies nausea, vomiting, fever, chills. Neurologic: Denies numbness, tingling, and burning in the feet. Vascular: Denies symptoms of lower extremity claudication. Skin: Denies open wounds. Otherwise negative except as noted in the HPI. PMH:  Past Medical History:   Diagnosis Date    Anxiety        Surgical History:   No past surgical history on file. 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 Authorizing Provider   ibuprofen (ADVIL;MOTRIN) 800 MG tablet Take 1 tablet by mouth every 8 hours as needed for Pain 8/21/20 9/11/20 Yes Tawanna Richardson DPM   traZODone (DESYREL) 150 MG tablet Take 150 mg by mouth 2 times daily   Yes Historical Provider, MD   gabapentin (NEURONTIN) 300 MG capsule Take 300 mg by mouth 3 times daily. Yes Historical Provider, MD   hydrOXYzine (VISTARIL) 25 MG capsule Take 25 mg by mouth 3 times daily as needed for Itching   Yes Historical Provider, MD   cloNIDine (CATAPRES) 0.2 MG tablet Take 0.2 mg by mouth 2 times daily   Yes Historical Provider, MD   buprenorphine-naloxone (SUBOXONE) 4-1 MG FILM SL film Place 1 Film under the tongue daily. Alvera Stager Historical Provider, MD   hydrOXYzine (ATARAX) 50 MG tablet  8/27/20   Historical Provider, MD   escitalopram (LEXAPRO) 10 MG tablet  8/27/20   Historical Provider, MD   cloNIDine (CATAPRES) 0.1 MG tablet  8/31/20   Historical Provider, MD       Objective     Vitals:    09/04/20 1350   BP: 112/69   Pulse: 68   Temp: 97 °F (36.1 °C)       No results found for: LABA1C    Physical Exam:  General:  Alert and oriented x3. In no acute distress. Lower Extremity Physical Exam:    Vascular: DP and PT pulses are palpable, Bilateral. CFT <3 seconds to all digits, Bilateral.  Nonpitting edema, right lower extremity. Increased warmth noted to the right calf. Hair growth is absent to the level of the digits, Bilateral.     Neuro: Saph/sural/SP/DP/plantar sensation intact to light touch. Protective sensation is intact to 10/10 sites as tested with a 5.07g SWMF, Bilateral.     Musculoskeletal: EHL/FHL/GS/TA gross motor intact. Tenderness to palpation of right medial prox phalanx 3rd digit and plantar 3rd met head. Gross deformity of hallux abducto valgus is present, Bilateral.     Dermatologic: no open lesions, Bilateral.  Interdigital maceration absent, Bilateral.  Nails 1-10 are normotrophic. MRI right foot: 7/26/20  Impression    1. Abnormal bone marrow signal along the medial margin of the 3rd toe    proximal phalanx and 3rd metatarsal head/distal diaphysis.  Given the    clinical context, differential includes osteomyelitis; however, periarticular    involvement with lack of joint effusion is atypical for an acute    osteomyelitis.  Please correlate with site of reported puncture wound. Differential includes healing fracture and avascular necrosis. 2. Subcutaneous edema/inflammation of the foot dorsum. 3. Myositis of the intrinsic foot musculature around the 3rd metatarsal.      Xray right foot, 7/25/20  Impression    Erosions 3rd metatarsal phalangeal joint suspicious for infection. Assessment   Marlys Delacruz is a 35 y.o. female with     Diagnosis Orders   1. Edema of lower extremity  VL DUP LOWER EXTREMITY VENOUS BILATERAL    CBC With Auto Differential    Sedimentation Rate    C-Reactive Protein    Reflux study/Reflux Venous Insufficiency Bilateral   2.  Closed displaced fracture of proximal phalanx of lesser toe of right foot, initial encounter          Plan   · Patient examined and evaluated  · Diagnosis and treatment options discussed in detail  · XR 8/21 reviewed - no interval changes from 2 wks prior   · Due to increased swelling of the right lower extremity patient was sent for a stat venous duplex of bilateral lower extremity. · Patient to obtain CBC, ESR, CRP labs prior to next visit  · Patient to obtain venous reflux prior to next visit due to possible venous insufficiency  · Educated on smoking cessation  · Surgical shoe dispensed. · Tubigrip/ace dispensed to RLE  · Patient to continue ibuprofen and rice to the right lower extremity  · Educated pt on signs of DVT and to report to ED if arise. · Patient to RTC in 1 week  · Please, call the office with any questions or concerns   · Seen and discussed and reviewed with Dr. Nyasia Samaniego. No orders of the defined types were placed in this encounter.     Orders Placed This Encounter   Procedures    VL DUP LOWER EXTREMITY VENOUS BILATERAL     Standing Status:   Future     Standing Expiration Date:   9/4/2021    Reflux study/Reflux Venous Insufficiency Bilateral     Standing Status:   Future     Standing Expiration Date:   9/4/2021     Order Specific Question:   Reason for exam:     Answer:   assess for venous insufficiency BLE    CBC With Auto Differential     Standing Status:   Future     Standing Expiration Date:   9/4/2021    Sedimentation Rate     Standing Status:   Future     Standing Expiration Date:   9/4/2021    C-Reactive Protein     Standing Status:   Future     Standing Expiration Date:   9/4/2021         Sadaf Blanca DPM   Podiatric Medicine & Surgery   9/4/2020 at 3:40 PM

## 2020-09-04 NOTE — PROGRESS NOTES
Patient instructed to remove shoes and socks, instructed to sit in exam chair. Current PCP name is Heri Stovall  and date of last visit New Patient. Do you have a follow up visit scheduled?   Yes or no , yes New Patient for PCP   If yes the date is 9/15/20

## 2020-09-08 ENCOUNTER — TELEPHONE (OUTPATIENT)
Dept: PODIATRY | Age: 40
End: 2020-09-08

## 2020-09-08 NOTE — TELEPHONE ENCOUNTER
Pt calling in this afternoon for test results. Pt says she has no idea if she's been called already or not because her phone has not been working until now. Please advise and return pt's call.

## 2020-09-09 NOTE — TELEPHONE ENCOUNTER
Called pt back, lm with  for pt to call the office. They will go over her doppler results fri at her appt. Pt still needs to get her labs done for podiatry.

## 2020-09-10 ENCOUNTER — HOSPITAL ENCOUNTER (OUTPATIENT)
Age: 40
Discharge: HOME | End: 2020-09-10
Payer: MEDICARE

## 2020-09-10 ENCOUNTER — HOSPITAL ENCOUNTER (OUTPATIENT)
Dept: VASCULAR LAB | Age: 40
Discharge: HOME | End: 2020-09-10
Payer: MEDICARE

## 2020-09-10 LAB
ABSOLUTE EOS #: 0.08 K/UL (ref 0–0.44)
ABSOLUTE IMMATURE GRANULOCYTE: <0.03 K/UL (ref 0–0.3)
ABSOLUTE LYMPH #: 2.84 K/UL (ref 1.1–3.7)
ABSOLUTE MONO #: 0.35 K/UL (ref 0.1–1.2)
BASOPHILS # BLD: 1 % (ref 0–2)
BASOPHILS ABSOLUTE: 0.03 K/UL (ref 0–0.2)
C-REACTIVE PROTEIN: 2.5 MG/L (ref 0–5)
DIFFERENTIAL TYPE: ABNORMAL
EOSINOPHILS RELATIVE PERCENT: 2 % (ref 1–4)
HCT VFR BLD CALC: 40.7 % (ref 36.3–47.1)
HEMOGLOBIN: 13.5 G/DL (ref 11.9–15.1)
IMMATURE GRANULOCYTES: 0 %
LYMPHOCYTES # BLD: 56 % (ref 24–43)
MCH RBC QN AUTO: 29.4 PG (ref 25.2–33.5)
MCHC RBC AUTO-ENTMCNC: 33.2 G/DL (ref 28.4–34.8)
MCV RBC AUTO: 88.7 FL (ref 82.6–102.9)
MONOCYTES # BLD: 7 % (ref 3–12)
NRBC AUTOMATED: 0 PER 100 WBC
PDW BLD-RTO: 14.2 % (ref 11.8–14.4)
PLATELET # BLD: 281 K/UL (ref 138–453)
PLATELET ESTIMATE: ABNORMAL
PMV BLD AUTO: 9.9 FL (ref 8.1–13.5)
RBC # BLD: 4.59 M/UL (ref 3.95–5.11)
RBC # BLD: ABNORMAL 10*6/UL
SEDIMENTATION RATE, ERYTHROCYTE: 14 MM (ref 0–20)
SEG NEUTROPHILS: 34 % (ref 36–65)
SEGMENTED NEUTROPHILS ABSOLUTE COUNT: 1.67 K/UL (ref 1.5–8.1)
WBC # BLD: 5 K/UL (ref 3.5–11.3)
WBC # BLD: ABNORMAL 10*3/UL

## 2020-09-10 PROCEDURE — 85652 RBC SED RATE AUTOMATED: CPT

## 2020-09-10 PROCEDURE — 93970 EXTREMITY STUDY: CPT

## 2020-09-10 PROCEDURE — 85025 COMPLETE CBC W/AUTO DIFF WBC: CPT

## 2020-09-10 PROCEDURE — 86140 C-REACTIVE PROTEIN: CPT

## 2020-09-10 PROCEDURE — 36415 COLL VENOUS BLD VENIPUNCTURE: CPT

## 2020-09-11 ENCOUNTER — OFFICE VISIT (OUTPATIENT)
Dept: PODIATRY | Age: 40
End: 2020-09-11
Payer: MEDICARE

## 2020-09-11 VITALS
TEMPERATURE: 97 F | BODY MASS INDEX: 36.15 KG/M2 | HEART RATE: 73 BPM | DIASTOLIC BLOOD PRESSURE: 68 MMHG | HEIGHT: 65 IN | SYSTOLIC BLOOD PRESSURE: 107 MMHG | WEIGHT: 217 LBS

## 2020-09-11 PROCEDURE — G8417 CALC BMI ABV UP PARAM F/U: HCPCS | Performed by: STUDENT IN AN ORGANIZED HEALTH CARE EDUCATION/TRAINING PROGRAM

## 2020-09-11 PROCEDURE — 99212 OFFICE O/P EST SF 10 MIN: CPT | Performed by: STUDENT IN AN ORGANIZED HEALTH CARE EDUCATION/TRAINING PROGRAM

## 2020-09-11 PROCEDURE — 99213 OFFICE O/P EST LOW 20 MIN: CPT | Performed by: STUDENT IN AN ORGANIZED HEALTH CARE EDUCATION/TRAINING PROGRAM

## 2020-09-11 PROCEDURE — G8427 DOCREV CUR MEDS BY ELIG CLIN: HCPCS | Performed by: STUDENT IN AN ORGANIZED HEALTH CARE EDUCATION/TRAINING PROGRAM

## 2020-09-11 PROCEDURE — 4004F PT TOBACCO SCREEN RCVD TLK: CPT | Performed by: STUDENT IN AN ORGANIZED HEALTH CARE EDUCATION/TRAINING PROGRAM

## 2020-09-11 NOTE — PROGRESS NOTES
[address]  [address] [facility] [address] [facility], [address] [facility]  Tel: [phone]   Fax: [phone]    Subjective     CC: Puncture wound right foot    Interval hx:  Patient states that she continues to have increased right leg swelling but reports that has been improving the last week. She has noticed mild shortness of breath but attributes it to her weight gain. Patient states that she has been on her feet more due to work and her living facility. Patient states that she does continue to cherelle tape her third and fourth digits, patient states that the pain in her third and fourth digits are improving. Pain is located to just proximal to the third and fourth digit at the met heads. Patient states that she does elevate from time to time, but continues to ice behind the right knee. Patient is weightbearing in a CAM boot. Patient is requesting to be able to get out of the cam boot. Patient had a venous duplex scan on September 4 that showed a superficial venous thrombosis of the great saphenous vein of her right leg. September 10 she had another venous duplex scan that revealed a resolving venous thrombosis, and phlebitis of great saphenous vein just distal to the knee. Patient has no other pedal complaints. HPI:  Aide Du is a 35y.o. year old female who presents to clinic today for puncture wounds of right foot. Patient reports that a couple of days before 4 July she was gardening and stepped on a pitchfork and sustained 3 puncture wounds to the bottom of her right foot. At this time she describes an extreme amount of swelling in her right foot. She was then seen at [facility] who reported she had a fracture. Patient went to the ER on July 25 to follow-up after receiving the fracture diagnosis. At this appointment in the [facility] ER and x-ray was taken that showed possible fracture versus osteomyelitis versus avascular necrosis.   She was given a postop shoe and told to cherelle tape until her podiatry clinic visit. Today she reports that the wounds are completely healed but that her right foot and leg still swell progressively throughout the day. She also relates that she is currently staying in the [address] [place] [address],[facility] Floor for recovering addict and does not want to be admitted to the hospital for any reason. Patient states that she smokes 8 cigarettes/day. Patient had venous duplex of lower extremity on the right which was negative for DVT. No other complaints at this time. ROS:    Constitutional: Denies nausea, vomiting, fever, chills. Neurologic: Denies numbness, tingling, and burning in the feet. Vascular: Denies symptoms of lower extremity claudication. Skin: Denies open wounds. Otherwise negative except as noted in the HPI. PMH:  Past Medical History:   Diagnosis Date    Anxiety        Surgical History:   No past surgical history on file. 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 Authorizing Provider   hydrOXYzine (ATARAX) 50 MG tablet  8/27/20  Yes Historical Provider, MD   escitalopram (LEXAPRO) 10 MG tablet  8/27/20  Yes Historical Provider, MD   cloNIDine (CATAPRES) 0.1 MG tablet  8/31/20  Yes Historical Provider, MD   ibuprofen (ADVIL;MOTRIN) 800 MG tablet Take 1 tablet by mouth every 8 hours as needed for Pain 8/21/20 9/11/20 Yes Sima Dejesus DPM   traZODone (DESYREL) 150 MG tablet Take 150 mg by mouth 2 times daily   Yes Historical Provider, MD   gabapentin (NEURONTIN) 300 MG capsule Take 300 mg by mouth 3 times daily.    Yes Historical Provider, MD   hydrOXYzine (VISTARIL) 25 MG capsule Take 25 mg by mouth 3 times daily as needed for Itching   Yes Historical Provider, MD   cloNIDine (CATAPRES) 0.2 MG tablet Take 0.2 mg by mouth 2 times daily   Yes Historical Provider, MD   buprenorphine-naloxone (SUBOXONE) 4-1 MG FILM SL film Place 1 Film under the tongue daily. Jazz Hong Historical Provider, MD       Objective     Vitals:    09/11/20 1405   BP: 107/68   Pulse: 73   Temp: 97 °F (36.1 °C)       No results found for: LABA1C    Physical Exam:  General:  Alert and oriented x3. In no acute distress. Lower Extremity Physical Exam of right leg:    Vascular: DP and PT pulses are palpable, Bilateral. CFT <3 seconds to all digits, Bilateral.  Nonpitting edema, right lower extremity. No increased warmth noted to the right calf. Hair growth is absent to the level of the digits, Bilateral.     Neuro: Saph/sural/SP/DP/plantar sensation intact to light touch. Protective sensation is intact to 10/10 sites as tested with a 5.07g SWMF, Bilateral.     Musculoskeletal: EHL/FHL/GS/TA gross motor intact. No tenderness to palpation of right medial prox phalanx of 3rd digit and mild pain to palpation of plantar 3rd met head. Gross deformity of hallux abducto valgus is present, Bilateral.     Dermatologic: no open lesions, Bilateral.  Interdigital maceration absent, Bilateral.  Nails 1-10 are normotrophic. Venous duplex 9/11/20  No evidence of deep venous thrombosis in the right lower extremity.     Superficial phlebitis of the right great saphenous vein.     No evidence of superficial or deep venous thrombosis in the left lower     extremity.    Frutoso Counts and left great saphenous veins are patent with measurements as    Vernal Macleod. MRI right foot: 7/26/20  Impression    1.  Abnormal bone marrow signal along the medial margin of the 3rd toe    proximal phalanx and 3rd metatarsal head/distal diaphysis.  Given the    clinical context, differential includes osteomyelitis; however, periarticular    involvement with lack of joint effusion is atypical for an acute    osteomyelitis.  Please correlate with site of reported puncture wound. Differential includes healing fracture and avascular necrosis. 2. Subcutaneous edema/inflammation of the foot dorsum. 3. Myositis of the intrinsic foot musculature around the 3rd metatarsal.      Xray right foot, 7/25/20  Impression    Erosions 3rd metatarsal phalangeal joint suspicious for infection. Assessment   Marcelina Schwartz is a 35 y.o. female with     Diagnosis Orders   1. Localized edema     2. Acute superficial venous thrombosis of lower extremity, right        Avulsion fracture base of right 3rd proximal phalanx     Plan   · Patient examined and evaluated  · Diagnosis and treatment options discussed in detail  · Venous duplex 9/4/20 and 9/10/12 8/21 reviewed Supervial venous thromobosis resolving. · Reviewed CBC, ESR, CRP labs, all are within normal limits. · Educated on smoking cessation   · Tubigrip/ace dispensed to RLE  · Boudin splint dispensed for RLE. · Patient OK to transition from CAM boot to good shoes. Patient may return to CAM boot if her symptoms worsen. · Patient to continue ibuprofen and RICE to the right lower extremity  · Educated pt on signs of DVT and to report to ED if any arise. · Patient to RTC as needed  · Please, call the office with any questions or concerns   · Seen and discussed and reviewed with Dr. Lilian Carrion. No orders of the defined types were placed in this encounter. No orders of the defined types were placed in this encounter. Maricruz Brito DPM  Podiatric Medicine & Surgery   9/18/2020 at 3:53 PM     I performed a history and physical examination of the patient and discussed management with the patient and the resident. I reviewed the residents note and agree with the documented findings and plan of care. Any areas of disagreement are noted below. I was personally present for the key portions of any procedures.  Additional findings are as noted below.     Nicole Cordova DPM 9/18/2020 at 3:54 PM

## 2020-09-11 NOTE — PROGRESS NOTES
Patient instructed to remove shoes and socks, instructed to sit in exam chair. Current PCP name is Ramirez Ford at this time and date of last visit. Do you have a follow up visit scheduled?   9/29/20   If yes the date is

## 2020-09-30 ENCOUNTER — NURSE TRIAGE (OUTPATIENT)
Dept: OTHER | Facility: CLINIC | Age: 40
End: 2020-09-30

## 2020-09-30 ENCOUNTER — TELEPHONE (OUTPATIENT)
Dept: PODIATRY | Age: 40
End: 2020-09-30

## 2020-09-30 NOTE — TELEPHONE ENCOUNTER
Reason for Disposition   Swollen foot (Exceptions: localized bump from bunions, calluses, insect bite, sting)    Answer Assessment - Initial Assessment Questions  1. ONSET: \"When did the pain start? \"       Pain and swelling as not stopped since initial injury   2. LOCATION: \"Where is the pain located? \"       Right foot   3. PAIN: \"How bad is the pain? \"    (Scale 1-10; or mild, moderate, severe)    -  MILD (1-3): doesn't interfere with normal activities     -  MODERATE (4-7): interferes with normal activities (e.g., work or school) or awakens from sleep, limping     -  SEVERE (8-10): excruciating pain, unable to do any normal activities, unable to walk      4/10- starts in the thigh and goes down into leg. 4. WORK OR EXERCISE: \"Has there been any recent work or exercise that involved this part of the body? \"       No   5. CAUSE: \"What do you think is causing the foot pain? \"      Prior surgery   6. OTHER SYMPTOMS: \"Do you have any other symptoms? \" (e.g., leg pain, rash, fever, numbness)      Numbness in foot. 7. PREGNANCY: \"Is there any chance you are pregnant? \" \"When was your last menstrual period? \"      No    Protocols used: FOOT PAIN-ADULT-[place]    Care advice given. Instructed to call back, go to urgent care or ED if symptoms worsen. Connected caller to [facility]. Call from [place]. Caller plans to follow through with advice. No further questions. Explained dispo and caller prefers to be seen tomorrow. Please do not respond to the triage nurse through this encounter. Any subsequent communication should be directly with the patient.

## 2020-10-07 RX ORDER — IBUPROFEN 800 MG/1
800 TABLET ORAL EVERY 8 HOURS PRN
Qty: 63 TABLET | Refills: 0 | Status: SHIPPED | OUTPATIENT
Start: 2020-10-07 | End: 2020-10-09

## 2020-10-09 RX ORDER — IBUPROFEN 800 MG/1
800 TABLET ORAL EVERY 8 HOURS PRN
Qty: 63 TABLET | Refills: 0 | Status: SHIPPED | OUTPATIENT
Start: 2020-10-09 | End: 2020-10-30

## 2020-10-17 ENCOUNTER — APPOINTMENT (OUTPATIENT)
Dept: GENERAL RADIOLOGY | Age: 40
End: 2020-10-17
Payer: MEDICARE

## 2020-10-17 ENCOUNTER — HOSPITAL ENCOUNTER (EMERGENCY)
Age: 40
Discharge: HOME | End: 2020-10-17
Attending: EMERGENCY MEDICINE
Payer: MEDICARE

## 2020-10-17 VITALS
TEMPERATURE: 97 F | DIASTOLIC BLOOD PRESSURE: 81 MMHG | OXYGEN SATURATION: 97 % | SYSTOLIC BLOOD PRESSURE: 119 MMHG | BODY MASS INDEX: 36.82 KG/M2 | HEART RATE: 86 BPM | WEIGHT: 221 LBS | HEIGHT: 65 IN | RESPIRATION RATE: 16 BRPM

## 2020-10-17 PROCEDURE — 73562 X-RAY EXAM OF KNEE 3: CPT

## 2020-10-17 PROCEDURE — 99284 EMERGENCY DEPT VISIT MOD MDM: CPT

## 2020-10-17 PROCEDURE — 93970 EXTREMITY STUDY: CPT

## 2020-10-17 RX ORDER — ACETAMINOPHEN 325 MG/1
1000 TABLET ORAL 3 TIMES DAILY
Qty: 120 TABLET | Refills: 0 | Status: SHIPPED | OUTPATIENT
Start: 2020-10-17 | End: 2020-10-17

## 2020-10-17 RX ORDER — IBUPROFEN 800 MG/1
800 TABLET ORAL EVERY 6 HOURS PRN
Qty: 21 TABLET | Refills: 0 | Status: SHIPPED | OUTPATIENT
Start: 2020-10-17

## 2020-10-17 RX ORDER — ACETAMINOPHEN 325 MG/1
325 TABLET ORAL EVERY 6 HOURS PRN
Qty: 120 TABLET | Refills: 0 | Status: SHIPPED | OUTPATIENT
Start: 2020-10-17 | End: 2020-10-17

## 2020-10-17 RX ORDER — KETOROLAC TROMETHAMINE 30 MG/ML
30 INJECTION, SOLUTION INTRAMUSCULAR; INTRAVENOUS ONCE
Status: DISCONTINUED | OUTPATIENT
Start: 2020-10-17 | End: 2020-10-17 | Stop reason: HOSPADM

## 2020-10-17 RX ORDER — ACETAMINOPHEN 500 MG
1000 TABLET ORAL 3 TIMES DAILY
Qty: 360 TABLET | Refills: 1 | Status: SHIPPED | OUTPATIENT
Start: 2020-10-17

## 2020-10-17 ASSESSMENT — PAIN DESCRIPTION - DESCRIPTORS: DESCRIPTORS: TINGLING;ACHING

## 2020-10-17 ASSESSMENT — PAIN SCALES - GENERAL: PAINLEVEL_OUTOF10: 7

## 2020-10-17 ASSESSMENT — PAIN DESCRIPTION - LOCATION: LOCATION: LEG;KNEE;FOOT

## 2020-10-17 ASSESSMENT — PAIN DESCRIPTION - FREQUENCY: FREQUENCY: CONTINUOUS

## 2020-10-17 ASSESSMENT — PAIN DESCRIPTION - ORIENTATION: ORIENTATION: RIGHT

## 2020-10-17 NOTE — ED PROVIDER NOTES
[facility]  ED  Emergency Department Encounter  Emergency Medicine Resident     Pt Name: Aleksandra Christina  MRN: [id number]  [place] 1980  Date of evaluation: 10/17/20  PCP:  No primary care provider on file. CHIEF COMPLAINT       Chief Complaint   Patient presents with    Leg Pain     right        HISTORY OFPRESENT ILLNESS  (Location/Symptom, Timing/Onset, Context/Setting, Quality, Duration, Modifying Factors,Severity.)      Aleksandra Christina is a 35 y.o. female who presents with concern for mechanical fall. Patient states that her right knee and left knee has been bothering her for quite some time, she denies any syncopal events, denies any head trauma, loss of consciousness, is not on anticoagulation. Patient states she lives in a group home and she was requested to come be evaluated at the emergency department. Patient has bilateral knee pain, right greater than left, with history of concern for right calf swelling with associated right feet injuries. Patient had a venous duplex ultrasound in September that showed greater saphenous vein with lesser compressibility than the left leg. Patient is not on any anticoagulation antiplatelet therapy. Patient has not been taking prescribed NSAIDs for this concern for external blood clot, not using warm compresses. Patient denies any chest pain, shortness of breath, nausea vomiting fever, chills. Patient is not on any hormonal therapy, has no recent immobilizations long, long car rides, hemoptysis, has stable vital signs. PAST MEDICAL / SURGICAL / SOCIAL / FAMILY HISTORY      has a past medical history of Anxiety. has no past surgical history on file.      Social History     Socioeconomic History    Marital status: Single     Spouse name: Not on file    Number of children: Not on file    Years of education: Not on file    Highest education level: Not on file   Occupational History    Not on file   Social Needs    Financial resource strain: Not on file    Food insecurity     Worry: Not on file     Inability: Not on file    Transportation needs     Medical: Not on file     Non-medical: Not on file   Tobacco Use    Smoking status: Heavy Tobacco Smoker     Packs/day: 0.50     Years: 15.00     Pack years: 7.50     Types: Cigarettes    Smokeless tobacco: Never Used   Substance and Sexual Activity    Alcohol use: No    Drug use: Yes     Types: IV, Opiates      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    Sexual activity: Not on file   Lifestyle    Physical activity     Days per week: Not on file     Minutes per session: Not on file    Stress: Not on file   Relationships    Social connections     Talks on phone: Not on file     Gets together: Not on file     Attends Druze service: Not on file     Active member of club or organization: Not on file     Attends meetings of clubs or organizations: Not on file     Relationship status: Not on file    Intimate partner violence     Fear of current or ex partner: Not on file     Emotionally abused: Not on file     Physically abused: Not on file     Forced sexual activity: Not on file   Other Topics Concern    Not on file   Social History Narrative    Not on file       History reviewed. No pertinent family history. Allergies:  Patient has no known allergies. Home Medications:  Prior to Admission medications    Medication Sig Start Date End Date Taking?  Authorizing Provider   ibuprofen (IBU) 800 MG tablet Take 1 tablet by mouth every 6 hours as needed for Pain 10/17/20  Yes Linda Gonzalez, DO   acetaminophen (TYLENOL) 500 MG tablet Take 2 tablets by mouth 3 times daily 10/17/20  Yes Shannon Gonzalez, DO   ibuprofen (ADVIL;MOTRIN) 800 MG tablet TAKE 1 TABLET BY MOUTH EVERY 8 HOURS AS NEEDED FOR PAIN  10/9/20 10/30/20  Ayden Duncan DPM   hydrOXYzine (ATARAX) 50 MG tablet  8/27/20   Historical Provider, MD   escitalopram (LEXAPRO) 10 MG tablet  8/27/20   Historical Provider, MD   cloNIDine (CATAPRES) 0.1 MG tablet  8/31/20   Historical Provider, MD   traZODone (DESYREL) 150 MG tablet Take 150 mg by mouth 2 times daily    Historical Provider, MD   gabapentin (NEURONTIN) 300 MG capsule Take 300 mg by mouth 3 times daily. Historical Provider, MD   hydrOXYzine (VISTARIL) 25 MG capsule Take 25 mg by mouth 3 times daily as needed for Itching    Historical Provider, MD   cloNIDine (CATAPRES) 0.2 MG tablet Take 0.2 mg by mouth 2 times daily    Historical Provider, MD   buprenorphine-naloxone (SUBOXONE) 4-1 MG FILM SL film Place 1 Film under the tongue daily. Efraín Esquivel Historical Provider, MD       REVIEW OFSYSTEMS    (2-9 systems for level 4, 10 or more for level 5)      Constitutional ROS - No recent fevers, No recent chills  Neurological ROS - No Headache, No Syncope  Opthalmologic ROS- No eye pain, No vision changes   ENT ROS - No sore throat, No congestion  Respiratory ROS - No cough, No shortness of breath  Cardiovascular ROS - No chest pain, No palpitations   Gastrointestinal ROS - No abdominal pain, No nausea, No vomiting  Genito-Urinary ROS - No dysuria, Nohematuria  Musculoskeletal ROS - No back pain, No neck pain, +LEG PAIN   Dermatological ROS - No wound, No rash  PHYSICAL EXAM   (up to 7 for level 4, 8 or more forlevel 5)      INITIAL VITALS:   ED Triage Vitals [10/17/20 1519]   BP Temp Temp Source Pulse Resp SpO2 Height Weight   -- 97 °F (36.1 °C) Tympanic -- -- -- -- --       Physical Exam  Constitutional:       Appearance: She is well-developed. HENT:      Head: Normocephalic and atraumatic. Eyes:      Pupils: Pupils are equal, round, and reactive to light. Neck:      Musculoskeletal: Normal range of motion and neck supple. Cardiovascular:      Rate and Rhythm: Normal rate and regular rhythm. Pulmonary:      Effort: Pulmonary effort is normal. No respiratory distress. Breath sounds: Normal breath sounds. No stridor.    Abdominal:      General: Bowel sounds are normal. There is no distension. Palpations: Abdomen is soft. Musculoskeletal: Normal range of motion. General: No deformity. Comments: Range of motion intact and equal bilaterally, strength 5 out of 5 on all bilateral lower extremity range of motion test, 2+ DP and PT pulses, no sensory deficits, some swelling on the proximal calf ranging into the popliteal fossa   Skin:     General: Skin is warm and dry. Capillary Refill: Capillary refill takes less than 2 seconds. Neurological:      Mental Status: She is alert and oriented to person, place, and time. Psychiatric:         Behavior: Behavior normal.         DIFFERENTIAL  DIAGNOSIS     PLAN (LABS / IMAGING / EKG):  Orders Placed This Encounter   Procedures    VL DUP LOWER EXTREMITY VENOUS BILATERAL    XR KNEE RIGHT (3 VIEWS)       MEDICATIONS ORDERED:  Orders Placed This Encounter   Medications    ketorolac (TORADOL) injection 30 mg    ibuprofen (IBU) 800 MG tablet     Sig: Take 1 tablet by mouth every 6 hours as needed for Pain     Dispense:  21 tablet     Refill:  0    DISCONTD: acetaminophen (TYLENOL) 325 MG tablet     Sig: Take 1 tablet by mouth every 6 hours as needed for Pain     Dispense:  120 tablet     Refill:  0    DISCONTD: acetaminophen (TYLENOL) 325 MG tablet     Sig: Take 3 tablets by mouth 3 times daily     Dispense:  120 tablet     Refill:  0    acetaminophen (TYLENOL) 500 MG tablet     Sig: Take 2 tablets by mouth 3 times daily     Dispense:  360 tablet     Refill:  1       DDX: DVT, osteoarthritis, arthritic disease, patella fracture, knee fracture, leg injury    Initial MDM/Plan: 35 y.o. female who presents with concern for knee pain, right side greater than left, she does admit to bilateral knee pain for quite some time. Patient lives in a group home, has a history of 96 days clean from heroin use. Patient takes Suboxone daily. Patient takes Motrin, states 800 mg daily and nightly.   Patient states that she has a history of a DVT, on further evaluation of chart review it is a greater saphenous vein lesser compressibility than the left leg. Patient does have some swelling to the right leg, no tenderness on calf palpation. Patient has bilateral equal posterior tibialis and dorsalis pedal pulses. Patient has no sensory deficits on exam.  There is concern for DVT, versus osteoarthritic disease. Patient has been able to ambulate since the event. Patient was able to get up on her own after the event. Patient has no obvious deformities, no laxity on valgus or varus strain of the knee on examination. Patient is not on antiplatelet or anticoagulant therapy. Will order a venous duplex ultrasound bilateral legs in addition to a right knee x-ray. Likely patient will be discharged home. Disposition and medication planning will depend on patient's diagnostic imaging results. DIAGNOSTIC RESULTS / EMERGENCYDEPARTMENT COURSE / MDM     LABS:  Labs Reviewed - No data to display      RADIOLOGY:  Xr Knee Right (3 Views)    Result Date: 10/17/2020  EXAMINATION: THREE XRAY VIEWS OF THE RIGHT KNEE 10/17/2020 4:17 pm COMPARISON: 02/08/2018 HISTORY: ORDERING SYSTEM PROVIDED HISTORY: fall TECHNOLOGIST PROVIDED HISTORY: fall Reason for Exam: pain Acuity: Acute Type of Exam: Initial FINDINGS: Alignment is anatomic. No fractures or destructive bony abnormalities are seen. Tricompartmental osteoarthritic changes are noted. 1. No acute bony or joint abnormality 2. Tricompartmental osteoarthritic changes         EKG  NA    All EKG's are interpreted by the Emergency Department Physicianwho either signs or Co-signs this chart in the absence of a cardiologist.    EMERGENCY DEPARTMENT COURSE:     Patient had negative DVT scan. Patient's knee x-ray is consistent with osteoarthritic changes.   We will plan to discharge patient home with encouragement to follow-up with primary care physician and treat with NSAIDs. PROCEDURES:  None    CONSULTS:  None    CRITICAL CARE:  Please see attending note    FINAL IMPRESSION      1. Osteoarthritis of right knee, unspecified osteoarthritis type          DISPOSITION / PLAN     DISPOSITION        PATIENT REFERRED TO:  [facility]  [address] [address]  [phone]  Call   As needed      DISCHARGE MEDICATIONS:  Discharge Medication List as of 10/17/2020  6:48 PM      START taking these medications    Details   !! ibuprofen (IBU) 800 MG tablet Take 1 tablet by mouth every 6 hours as needed for Pain, Disp-21 tablet,R-0Print      acetaminophen (TYLENOL) 325 MG tablet Take 1 tablet by mouth every 6 hours as needed for Pain, Disp-120 tablet,R-0Print       !! - Potential duplicate medications found. Please discuss with provider.           Idalia Murphy DO  Emergency Medicine Resident    (Please note that portions of this note were completed with a voice recognition program.Efforts were made to edit the dictations but occasionally words are mis-transcribed.)        Idalia Murphy DO  Resident  10/17/20 2468

## 2020-10-17 NOTE — ED PROVIDER NOTES
FACULTY SIGN-OUT  ADDENDUM       Patient: Ava Morton   MRN: [id number]  PCP:  No primary care provider on file. Attestation  I was available and discussed any additional care issues that arose and coordinated the management plans with the resident(s) caring for the patient during my duty period. Any areas of disagreement with resident's documentation of care or procedures are noted on the chart. I was personally present for the key portions of any/all procedures during my duty period. I have documented in the chart those procedures where I was not present during the key portions. The patient's initial evaluation and plan have been discussed with the prior provider who initially evaluated the patient. Pertinent Comments: The patient is a 35 y.o. female taken in signout with some right lower extremity pain mostly about the knee but intact range of motion neurovascular intact distally.     We are awaiting x-ray as well as Doppler and reevaluation after    ED COURSE      The patient was given the following medications:  Orders Placed This Encounter   Medications    ketorolac (TORADOL) injection 30 mg       RECENT VITALS:   BP: 119/81  Pulse: 86  Resp: 16  Temp: 97 °F (36.1 °C) SpO2: 97 %    (Please note that portions of this note were completed with a voice recognition program.  Efforts were made to edit the dictations but occasionally words are mis-transcribed.)    MD Meghana Kuo  Attending Emergency Medicine Physician       Sofy Zimmer MD  10/17/20 9614

## 2020-10-17 NOTE — ED PROVIDER NOTES
[facility] ED     Emergency Department     Faculty Attestation    I performed a history and physical examination of the patient and discussed management with the resident. I reviewed the residents note and agree with the documented findings and plan of care. Any areas of disagreement are noted on the chart. I was personally present for the key portions of any procedures. I have documented in the chart those procedures where I was not present during the key portions. I have reviewed the emergency nurses triage note. I agree with the chief complaint, past medical history, past surgical history, allergies, medications, social and family history as documented unless otherwise noted below. For Physician Assistant/ Nurse Practitioner cases/documentation I have personally evaluated this patient and have completed at least one if not all key elements of the E/M (history, physical exam, and MDM). Additional findings are as noted. Patient presents with right knee and right lower leg pain and swelling. She says this is been worsening over the past month or so. She denies any specific injury. She denies fever, chills, chest pain, shortness of breath. On exam, patient is resting comfortably in the bed. There is moderate edema to the right lower leg without erythema or warmth. There is also mild tenderness over the right anterior knee. No deformity. Pulses and sensation are intact. We will get an x-ray of the right knee as well as Doppler ultrasound of the lower extremities. Will treat patient's pain and reassess.       Maddy Caruso MD  Attending Emergency  Physician              Melvin Ovalle MD  10/17/20 [id number]

## 2020-10-17 NOTE — ED NOTES
Pt arrived to ED with c/o RLE pain & swelling  Pt reports she fractured her foot several months ago and states she has had unresolved pain throughout her leg since;   Pt also reports she was recently diagnosed with a blood clot in her RLE that resolved on its own;  Pt A&Ox4; RR even/unlabored; NAD noted;   call light within reach; will cont to monitor       Sandro Dejesus RN  10/17/20 9300

## 2020-10-23 ENCOUNTER — OFFICE VISIT (OUTPATIENT)
Dept: PODIATRY | Age: 40
End: 2020-10-23
Payer: MEDICARE

## 2020-10-23 VITALS
SYSTOLIC BLOOD PRESSURE: 106 MMHG | TEMPERATURE: 97.3 F | HEIGHT: 65 IN | HEART RATE: 64 BPM | BODY MASS INDEX: 37.82 KG/M2 | WEIGHT: 227 LBS | DIASTOLIC BLOOD PRESSURE: 65 MMHG

## 2020-10-23 PROBLEM — M79.89 RIGHT LEG SWELLING: Status: ACTIVE | Noted: 2020-10-23

## 2020-10-23 PROCEDURE — 4004F PT TOBACCO SCREEN RCVD TLK: CPT | Performed by: STUDENT IN AN ORGANIZED HEALTH CARE EDUCATION/TRAINING PROGRAM

## 2020-10-23 PROCEDURE — G8484 FLU IMMUNIZE NO ADMIN: HCPCS | Performed by: STUDENT IN AN ORGANIZED HEALTH CARE EDUCATION/TRAINING PROGRAM

## 2020-10-23 PROCEDURE — G8427 DOCREV CUR MEDS BY ELIG CLIN: HCPCS | Performed by: STUDENT IN AN ORGANIZED HEALTH CARE EDUCATION/TRAINING PROGRAM

## 2020-10-23 PROCEDURE — 99212 OFFICE O/P EST SF 10 MIN: CPT | Performed by: STUDENT IN AN ORGANIZED HEALTH CARE EDUCATION/TRAINING PROGRAM

## 2020-10-23 PROCEDURE — 99213 OFFICE O/P EST LOW 20 MIN: CPT | Performed by: STUDENT IN AN ORGANIZED HEALTH CARE EDUCATION/TRAINING PROGRAM

## 2020-10-23 PROCEDURE — G8417 CALC BMI ABV UP PARAM F/U: HCPCS | Performed by: STUDENT IN AN ORGANIZED HEALTH CARE EDUCATION/TRAINING PROGRAM

## 2020-10-23 NOTE — PROGRESS NOTES
Patient instructed to remove shoes and socks and instructed to sit in exam chair. Current PCP is [address] [place] [address] and date of last visit was 2 weeks ago. Do you have a follow up visit scheduled?   No  If yes, the date is na

## 2020-10-23 NOTE — PROGRESS NOTES
[address]  [address] [place] [address], [place] [address] [place]  Tel: [phone]   Fax: [phone]    Subjective     CC: Puncture wound right foot  HPI:  Mary Carmen Thibodeaux is a 35y.o. year old female who presents to clinic today for consult for her leg swelling. Patient has a history of injury to her right foot when she stepped on a rake, and sustained a puncture wound. Patient suffered a fracture vs osteomyelitis of the 3rd metatarsal head with possible plantar plate injury. Patient was placed in a CAM boot and her symptoms improved and returned to regular shoe gear. Before returning to regular shoe gear, patient developed a superficial VT that was treated, but left residual swelling to her RLE. Patient states she came in today because she fell, and when she went to the ED she had another DVT work-up which patient states revealed possible scarring to the great saphenous vein. Patient was told this is likely what's causing her swelling. Patient states she still has pain to her forefoot around the 3rd and 4th met head. Patient says it doesn't hurt to touch, but does hurt when she is walking or going up stairs. Patient also states that her big toe nails are painful to both sides, patient's states she thinks her nails are digging into her skin. Denies any N/V/F/C or SOB, patient has no other pedal complaints. ROS:    Constitutional: Denies nausea, vomiting, fever, chills. Neurologic: Denies numbness, tingling, and burning in the feet. Vascular: Denies symptoms of lower extremity claudication. Skin: Denies open wounds. Otherwise negative except as noted in the HPI. PMH:  Past Medical History:   Diagnosis Date    Anxiety        Surgical History:   No past surgical history on file.     Social History:  Social History     Tobacco Use    Smoking status: Heavy Tobacco Smoker     Packs/day: 0.50     Years: 15.00     Pack years: 7.50     Types: Cigarettes    Smokeless tobacco: Never venous insufficiency; Instructed patient on how to manage swelling. Educated patient on compression stockings and instructed her to keep her legs elevated as much as possible. · Educated patient on good shoe support; suggested patient purchase powersteps to help add support and cushion to her shoes. · Tubigrip dispensed to RLE  · Suggested that patient allow her hallux nail to grow out, and then to cut the nail's straight instead of angled posteriorly. Patient should also where shoes that are not confining to the forefoot. · Patient OK to continue with regular shoe gear  · Patient to continue ibuprofen and RICE to the right lower extremity  · Educated pt on signs of DVT and to report to ED if any arise. · Patient to RTC as needed  · Please, call the office with any questions or concerns   · Seen and discussed and reviewed with Dr. Manuelito Bah. No orders of the defined types were placed in this encounter. No orders of the defined types were placed in this encounter.       Phyllis Osman DPM  Podiatric Medicine & Surgery   10/23/2020 at 1:27 PM

## 2020-10-26 RX ORDER — IBUPROFEN 800 MG/1
800 TABLET ORAL EVERY 6 HOURS PRN
Qty: 21 TABLET | Refills: 0 | OUTPATIENT
Start: 2020-10-26

## 2021-01-13 RX ORDER — IBUPROFEN 800 MG/1
TABLET ORAL
Qty: 63 TABLET | Refills: 0 | OUTPATIENT
Start: 2021-01-13

## 2021-07-09 RX ORDER — ACETAMINOPHEN 500 MG
TABLET ORAL
Qty: 180 TABLET | Refills: 1 | OUTPATIENT
Start: 2021-07-09

## 2023-05-25 ENCOUNTER — OFFICE VISIT (OUTPATIENT)
Dept: DERMATOLOGY | Age: 43
End: 2023-05-25
Payer: MEDICAID

## 2023-05-25 VITALS
TEMPERATURE: 97.3 F | BODY MASS INDEX: 44.75 KG/M2 | SYSTOLIC BLOOD PRESSURE: 133 MMHG | DIASTOLIC BLOOD PRESSURE: 85 MMHG | HEIGHT: 62 IN | HEART RATE: 73 BPM | WEIGHT: 243.2 LBS

## 2023-05-25 DIAGNOSIS — R61 HYPERHIDROSIS: Primary | ICD-10-CM

## 2023-05-25 DIAGNOSIS — L30.8 OTHER SPECIFIED DERMATITIS: ICD-10-CM

## 2023-05-25 PROCEDURE — 99204 OFFICE O/P NEW MOD 45 MIN: CPT | Performed by: DERMATOLOGY

## 2023-05-25 RX ORDER — NICOTINE 21 MG/24HR
PATCH, TRANSDERMAL 24 HOURS TRANSDERMAL
COMMUNITY
Start: 2023-04-14

## 2023-05-25 RX ORDER — ALBUTEROL SULFATE 90 UG/1
AEROSOL, METERED RESPIRATORY (INHALATION)
COMMUNITY
Start: 2023-04-19

## 2023-05-25 RX ORDER — GLYCOPYRROLATE 1 MG/1
TABLET ORAL
Qty: 60 TABLET | Refills: 2 | Status: SHIPPED | OUTPATIENT
Start: 2023-05-25

## 2023-05-25 RX ORDER — TRIAMCINOLONE ACETONIDE 1 MG/G
CREAM TOPICAL
Qty: 80 G | Refills: 1 | Status: SHIPPED | OUTPATIENT
Start: 2023-05-25

## 2023-05-25 NOTE — PROGRESS NOTES
Systems  Skin: Denies any new changing, growing or bleeding lesions or rashes except as described in the HPI   Constitutional: Denies fevers, chills, and malaise. PHYSICAL EXAM:   /85 (Site: Left Upper Arm, Position: Sitting, Cuff Size: Medium Adult)   Pulse 73   Temp 97.3 °F (36.3 °C)   Ht 5' 2\" (1.575 m)   Wt 243 lb 3.2 oz (110.3 kg)   BMI 44.48 kg/m²     The patient is generally well appearing, well nourished, alert and conversational. Affect is normal.    Cutaneous Exam:  Physical Exam  Sun exposed + limited LEs: Head/face,neck, both arms, digits and/or nails and legs visible with pants/shorts and shoes/socks on was examined. Facial covering was removed during examination. Diagnoses/exam findings/medical history pertinent to this visit are listed below:    Assessment:   Diagnosis Orders   1. Hyperhidrosis  glycopyrrolate (ROBINUL) 1 MG tablet    aluminum chloride (DRYSOL) 20 % external solution      2. Other specified dermatitis  triamcinolone (KENALOG) 0.1 % cream           Plan:  Hyperhidrosis of scalp, face, neck  - chronic illness with progression and/or exacerbation  - discussed diagnosis, etiology, natural course, and treatment options  - start glycopyrrolate x 1-2 daily, discussed SE including dry mouth, urinary retention, and constipation. Discussed to let me know if she experiences any of these SE.   - start drysol to completely dry affected areas nightly  - Qbrexa and Botox in reserve    Eczematous patches vs. Psoriasis of left hip and lower back  - start triamcinolone 0.1% cream to affected areas twice daily  - biopsy in reserve      RTC 2 months    No future appointments. Patient Instructions   - start glycopyrrolate x 1-2 daily  - start drysol to completely dry affected areas nightly. If experiencing skin irritation, can back down to a few times per week. Let me know if experiencing any side effects from these medications.      - start triamcinolone 0.1% cream to dry

## 2023-05-25 NOTE — PATIENT INSTRUCTIONS
- start glycopyrrolate x 1-2 daily  - start drysol to completely dry affected areas nightly. If experiencing skin irritation, can back down to a few times per week. Let me know if experiencing any side effects from these medications. - start triamcinolone 0.1% cream to dry patches on hip and back twice daily until clear.

## 2023-07-13 NOTE — PROGRESS NOTES
Dermatology Patient Note  [address]  [address] [place] [address] [address] [place] [address]  Dept: [phone]  Dept Fax: [phone]      VISITDATE: 7/25/2023   REFERRING PROVIDER: No ref. provider found      Jane Mcmanus is a 36 y.o. female  who presents today in the office for:    Follow-up (HS- patient doesn't notice a big difference with the medication you gave her last visit Ribinul- )      HISTORY OF PRESENT ILLNESS:  Patient presents for 2 month hyperhidrosis f/u. At , she was started on glycopyrrolate and drysol. 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 Today, the patient denies any significant difference with the glycopyrrolate and has taken it up to 3x daily. She states that her sweat beads near her cheeks, forehead, and scalp. Drysol burned too much, patient could not use it. Patient brought a personal fan at today's visit and states that her excessive sweating interferes with her daily tasks. Patient's dermatitis was not discussed at today's visit. MEDICAL PROBLEMS:  Patient Active Problem List    Diagnosis Date Noted    Right leg swelling 10/23/2020    Puncture wound of right foot 07/27/2020    Closed displaced fracture of third metatarsal bone, initial encounter 07/27/2020    Right foot pain 07/25/2020       CURRENT MEDICATIONS:   Current Outpatient Medications   Medication Sig Dispense Refill    glycopyrrolate (ROBINUL) 1 MG tablet Take 1-2 tablets PO daily as needed for sweating 60 tablet 2    triamcinolone (KENALOG) 0.1 % cream Apply to rash twice daily (not face, armpit or groin) 80 g 1    cloNIDine (CATAPRES) 0.1 MG tablet       buprenorphine-naloxone (SUBOXONE) 4-1 MG FILM SL film Place 1 Film under the tongue daily.       VENTOLIN  (90 Base) MCG/ACT inhaler  (Patient not taking: Reported on 7/25/2023)      diclofenac sodium (VOLTAREN) 1 % GEL Apply 2 g topically 4 times daily

## 2023-07-25 ENCOUNTER — OFFICE VISIT (OUTPATIENT)
Dept: DERMATOLOGY | Age: 43
End: 2023-07-25
Payer: MEDICAID

## 2023-07-25 VITALS
HEART RATE: 82 BPM | BODY MASS INDEX: 38.65 KG/M2 | HEIGHT: 65 IN | TEMPERATURE: 97.7 F | SYSTOLIC BLOOD PRESSURE: 112 MMHG | DIASTOLIC BLOOD PRESSURE: 76 MMHG | WEIGHT: 232 LBS | OXYGEN SATURATION: 95 %

## 2023-07-25 DIAGNOSIS — R61 HYPERHIDROSIS: Primary | ICD-10-CM

## 2023-07-25 PROCEDURE — 99214 OFFICE O/P EST MOD 30 MIN: CPT | Performed by: DERMATOLOGY

## 2023-07-25 RX ORDER — OXYBUTYNIN CHLORIDE 10 MG/1
10 TABLET, EXTENDED RELEASE ORAL DAILY
Qty: 30 TABLET | Refills: 3 | Status: SHIPPED | OUTPATIENT
Start: 2023-07-25

## 2023-07-25 RX ORDER — GLYCOPYRRONIUM 2.4 G/100G
CLOTH TOPICAL
Qty: 30 EACH | Refills: 2 | Status: SHIPPED | OUTPATIENT
Start: 2023-07-25 | End: 2023-07-27 | Stop reason: SDUPTHER

## 2023-07-25 RX ORDER — OXYBUTYNIN CHLORIDE 10 MG/1
10 TABLET, EXTENDED RELEASE ORAL DAILY
Qty: 30 TABLET | Refills: 3 | Status: CANCELLED | OUTPATIENT
Start: 2023-07-25

## 2023-07-26 ENCOUNTER — TELEPHONE (OUTPATIENT)
Dept: DERMATOLOGY | Age: 43
End: 2023-07-26

## 2023-07-26 DIAGNOSIS — L74.510 AXILLARY HYPERHIDROSIS: Primary | ICD-10-CM

## 2023-07-26 DIAGNOSIS — R61 HYPERHIDROSIS: ICD-10-CM

## 2023-07-26 NOTE — TELEPHONE ENCOUNTER
Received a denial for Qbrexa, see media for denial letter and more information. How would you like to proceed?

## 2023-07-27 RX ORDER — GLYCOPYRRONIUM 2.4 G/100G
CLOTH TOPICAL
Qty: 30 EACH | Refills: 2 | Status: SHIPPED | OUTPATIENT
Start: 2023-07-27

## 2023-07-27 NOTE — TELEPHONE ENCOUNTER
Patient states she does have the excessive sweating in her armpits, she also wanted you to know her upper arms also

## 2023-07-27 NOTE — TELEPHONE ENCOUNTER
They denied because Himanshu Hopes is indicated only for axillary hyperhidrosis.  Ask patient if she ever experiences excess sweating in her armpits

## 2024-02-07 DIAGNOSIS — R61 HYPERHIDROSIS: ICD-10-CM

## 2024-02-07 RX ORDER — OXYBUTYNIN CHLORIDE 10 MG/1
10 TABLET, EXTENDED RELEASE ORAL DAILY
Qty: 30 TABLET | Refills: 0 | Status: SHIPPED | OUTPATIENT
Start: 2024-02-07

## 2024-02-07 NOTE — TELEPHONE ENCOUNTER
Pt is requesting a refill on oxybutynin (DITROPAN XL) 10 MG extended release tablet pt states she is completely out of meds

## 2024-02-16 ENCOUNTER — TELEMEDICINE (OUTPATIENT)
Dept: DERMATOLOGY | Age: 44
End: 2024-02-16

## 2024-02-16 DIAGNOSIS — R61 HYPERHIDROSIS: ICD-10-CM

## 2024-02-16 DIAGNOSIS — L74.510 AXILLARY HYPERHIDROSIS: ICD-10-CM

## 2024-02-16 RX ORDER — GLYCOPYRRONIUM 2.4 G/100G
CLOTH TOPICAL
Qty: 30 EACH | Refills: 5 | Status: SHIPPED | OUTPATIENT
Start: 2024-02-16

## 2024-02-16 RX ORDER — OXYBUTYNIN CHLORIDE 10 MG/1
10 TABLET, EXTENDED RELEASE ORAL DAILY
Qty: 30 TABLET | Refills: 5 | Status: SHIPPED | OUTPATIENT
Start: 2024-02-16

## 2024-03-11 DIAGNOSIS — R61 HYPERHIDROSIS: ICD-10-CM

## 2024-03-12 RX ORDER — OXYBUTYNIN CHLORIDE 10 MG/1
10 TABLET, EXTENDED RELEASE ORAL DAILY
Qty: 30 TABLET | Refills: 5 | Status: SHIPPED | OUTPATIENT
Start: 2024-03-12

## 2024-07-28 NOTE — PATIENT INSTRUCTIONS
- Stop glycopyrrolate   - Start Oxybutynin  - Start qbrexza wipes, wash hands after use and avoid getting near eyes. Start use below the eyes to determine if there is a difference. DISPLAY PLAN FREE TEXT

## 2024-10-18 ENCOUNTER — TELEPHONE (OUTPATIENT)
Dept: DERMATOLOGY | Age: 44
End: 2024-10-18

## 2024-10-18 ENCOUNTER — TELEMEDICINE (OUTPATIENT)
Dept: DERMATOLOGY | Age: 44
End: 2024-10-18

## 2024-10-18 DIAGNOSIS — L74.510 AXILLARY HYPERHIDROSIS: ICD-10-CM

## 2024-10-18 DIAGNOSIS — R61 HYPERHIDROSIS: Primary | ICD-10-CM

## 2024-10-18 RX ORDER — GLYCOPYRRONIUM 2.4 G/100G
CLOTH TOPICAL
Qty: 30 EACH | Refills: 5 | Status: SHIPPED | OUTPATIENT
Start: 2024-10-18

## 2024-10-18 NOTE — TELEPHONE ENCOUNTER
Qbrexa is not covered with medicaid at all.  We have 3 sample wipes left. Do you want to offer 3 samples to the patient for t/f documentation? Please advise.

## 2024-10-18 NOTE — PROGRESS NOTES
Pertinent ROS:  Review of Systems  Skin: Denies any new changing, growing or bleeding lesions or rashes except as described in the HPI   Constitutional: Denies fevers, chills, and malaise.    PHYSICAL EXAM:     Due to this being a TeleHealth encounter, evaluation of the following organ systems is limited: Vitals/Constitutional/EENT/Resp/CV/GI//MS/Neuro/Skin/Heme-Lymph-Imm. In particular examination of the skin is limited by video quality and patient available technology.    There were no vitals taken for this visit.    The patient is generally well appearing, well nourished, alert and conversational. Affect is normal.    Cutaneous Exam:  Physical Exam  Face and neck only were examined    Diagnoses/exam findings/medical history pertinent to this visit are listed below:    Assessment and Plan:  Assessment   1. Hyperhidrosis  - chronic illness with progression and/or exacerbation   - Glycopyrronium Tosylate (QBREXZA) 2.4 % PADS; Apply to affected areas daily, avoiding eyes. Wash hands thoroughly afterwards  Dispense: 30 each; Refill: 5  - onabotulinumtoxinA (BOTOX) 100 units injection; Inject 100 units into skin of scalp and forehead every 3 months (to be performed by physician)  Dispense: 1 each; Refill: 3  - failed glycopyrrolate, oxybutynin, and drysol  - will try for a combination of botox and qbrexa given distribution. Can do botox of forehead, temples, scalp but patient also has sweating of central face for which botox in necessary doses may be risk  for facial drooping. Will try Qbrexa focally (avoiding eyes)    2. Axillary hyperhidrosis  - Glycopyrronium Tosylate (QBREXZA) 2.4 % PADS; Apply to affected areas daily, avoiding eyes. Wash hands thoroughly afterwards  Dispense: 30 each; Refill: 5       Assessment & Plan   RTC 6 months, sooner for botox    There are no Patient Instructions on file for this visit.    Services were provided through a video synchronous discussion virtually to substitute for

## 2024-10-21 NOTE — TELEPHONE ENCOUNTER
It must be a new thing. I have spoken to [place] to get payable NDC and they advised there is not one. It is not covered at all.  Qbrexa samples brought today. 1 box has 5 single use wipes. 3 boxes delivered. Please advise.

## 2024-10-28 NOTE — TELEPHONE ENCOUNTER
Pt insurance has approved the botox, I can still give samples to her in the mean time while she waits for her botox appt if that's okay with you?

## 2024-10-29 NOTE — TELEPHONE ENCOUNTER
Patient has been informed that botox has been approved and that once we receive that in office we will call to set up appt, pt informed that we have qbrexa samples here in and she can come pick some up. Pt states that she will stop in and grab samples

## 2025-02-25 ENCOUNTER — PROCEDURE VISIT (OUTPATIENT)
Age: 45
End: 2025-02-25
Payer: MEDICAID

## 2025-02-25 DIAGNOSIS — R61 HYPERHIDROSIS: Primary | ICD-10-CM

## 2025-02-25 PROCEDURE — 64653 CHEMODENERV ECCRINE GLANDS: CPT | Performed by: DERMATOLOGY

## 2025-02-25 NOTE — PROGRESS NOTES
Dermatology Surgery Pre-Visit Checklist    (Please complete with  Y (yes) / N (no) or explain)    Pathologic Diagnosis: hyperhidrosis of the cranial/facial no    Photo available of surgery site in media: no    Competent to give informed consent? yes   If not, who is authorized to do so: no    Need for help for wound care: no   If so, who will do so: no    Are you diagnosed:   Diabetes: no   If yes, last A1C: NA       HIV: no       Hepatitis: no       Current smoker: sometimes  If yes, how much: 0.5 packs    So you have any of the following: Pacemaker: no       Defibrillator: no       Artificial Heart valve: no                Artificial Joints: no       Other Implantable Device: no       Organ Transplant: no       Other: no    Are you on blood thinners such as: Asprin: no       Warfarin/Coumadin: no       Other: no    Any allergies to the following:  Lidocaine: no       Iodine: no       Adhesive: no       Other: no     Lot#[id number]  NDC#3828-5361-80  EXP [id number]

## 2025-02-25 NOTE — PROGRESS NOTES
Dermatology Procedure Note   [facility]  [facility] DERMATOLOGY  [address] [place] [address]  Dept: [phone]  Dept Fax: [phone]      Procedure Date: 2/25/2025  Procedure Time: 12:32 PM    Procedure Practitioner: Hyun Avitia MD    Procedure:  chemodenervation of eccrine glands (scalp and forehead)    Pre-Procedure Diagnosis:  craniofacial hyperhidrosis    Post-Procedure Diagnosis: Same as Pre-Procedure Diagnosis    Informed Consent: The procedure and its risks were explained including but not limited to pain, bleeding, infection, permanent scar, permanent pigment alteration, recurrence, muscle weakness, facial droop. Consent to proceed with the procedure was obtained from the patient or the parent by the practitioner    Time Out:  A time out was conducted immediately before starting the procedure that confirmed a final verification of the correct patient, correct procedure, and correct site.    Procedure Details:  100 units of Botox were reconstituted in 4 ml of bacteriostatic saline and then drawn into 4, 1 cc syringes. The forehead and scalp were then cleaned with ETOH and a map was drawn for injection. 100 units of Botox was injected evenly over the upper 50% of the forehead, the entire scalp, and temples. The area was cleaned with alcohol and patient left in good condition.      Procedure Performed By: Hyun Avitia MD    Estimated Blood Loss: Minimal    Pathologic Specimen: none sent    Procedure Tolerance: Good    Complication(s): None    Electronically signed by Hyun Avitia MD on 2/25/25 at 12:32 PM EST

## 2025-05-08 ENCOUNTER — OFFICE VISIT (OUTPATIENT)
Age: 45
End: 2025-05-08

## 2025-05-08 VITALS
HEART RATE: 96 BPM | OXYGEN SATURATION: 100 % | BODY MASS INDEX: 50.64 KG/M2 | HEIGHT: 62 IN | TEMPERATURE: 97.4 F | DIASTOLIC BLOOD PRESSURE: 85 MMHG | SYSTOLIC BLOOD PRESSURE: 130 MMHG | WEIGHT: 275.2 LBS

## 2025-05-08 DIAGNOSIS — Z53.21 PATIENT LEFT WITHOUT BEING SEEN: Primary | ICD-10-CM

## 2025-05-15 DIAGNOSIS — R61 HYPERHIDROSIS: ICD-10-CM

## 2025-05-15 RX ORDER — OXYBUTYNIN CHLORIDE 10 MG/1
10 TABLET, EXTENDED RELEASE ORAL DAILY
Qty: 30 TABLET | Refills: 3 | Status: SHIPPED | OUTPATIENT
Start: 2025-05-15

## 2025-05-15 NOTE — TELEPHONE ENCOUNTER
Patient called requesting a refill of botox and a medication to help her with the hyperhidrosis to get her through to her appointment on 5/28/25.

## 2025-05-15 NOTE — TELEPHONE ENCOUNTER
Patient called requesting a refill of botox and a medication to help her with the hidradenitis to get her through to her appointment on 5/28/25.

## 2025-05-28 ENCOUNTER — TELEMEDICINE (OUTPATIENT)
Age: 45
End: 2025-05-28
Payer: MEDICAID

## 2025-05-28 DIAGNOSIS — R61 HYPERHIDROSIS: ICD-10-CM

## 2025-05-28 PROCEDURE — 99214 OFFICE O/P EST MOD 30 MIN: CPT | Performed by: DERMATOLOGY

## 2025-05-29 NOTE — PROGRESS NOTES
Faxed to [place]    
Future Appointments   Date Time Provider Department Center   7/15/2025 11:30 AM Hyun Avitia MD [facility]       
Her PA for botox is approved until 10/2025  
triamcinolone (KENALOG) 0.1 % cream Apply to rash twice daily (not face, armpit or groin) (Patient not taking: Reported on 5/8/2025) 80 g 1    aluminum chloride (DRYSOL) 20 % external solution Apply nightly to dried area, wash off in the morning, may reduce twice weekly for maintinance. (Patient not taking: Reported on 5/8/2025) 35 mL 5    ibuprofen (IBU) 800 MG tablet Take 1 tablet by mouth every 6 hours as needed for Pain (Patient not taking: Reported on 5/8/2025) 21 tablet 0    acetaminophen (TYLENOL) 500 MG tablet Take 2 tablets by mouth 3 times daily (Patient not taking: Reported on 5/8/2025) 360 tablet 1    ibuprofen (ADVIL;MOTRIN) 800 MG tablet TAKE 1 TABLET BY MOUTH EVERY 8 HOURS AS NEEDED FOR PAIN  (Patient not taking: Reported on 5/25/2023) 63 tablet 0    hydrOXYzine (ATARAX) 50 MG tablet  (Patient not taking: Reported on 5/8/2025)      escitalopram (LEXAPRO) 10 MG tablet  (Patient not taking: Reported on 5/8/2025)      cloNIDine (CATAPRES) 0.1 MG tablet       traZODone (DESYREL) 150 MG tablet Take 150 mg by mouth 2 times daily (Patient not taking: Reported on 5/8/2025)      gabapentin (NEURONTIN) 300 MG capsule Take 300 mg by mouth 3 times daily. (Patient not taking: Reported on 5/8/2025)      hydrOXYzine (VISTARIL) 25 MG capsule Take 25 mg by mouth 3 times daily as needed for Itching (Patient not taking: Reported on 5/8/2025)      cloNIDine (CATAPRES) 0.2 MG tablet Take 0.2 mg by mouth 2 times daily (Patient not taking: Reported on 5/8/2025)      buprenorphine-naloxone (SUBOXONE) 4-1 MG FILM SL film Place 1 Film under the tongue daily.       Current Facility-Administered Medications   Medication Dose Route Frequency Provider Last Rate Last Admin    onabotulinumtoxinA (BOTOX) injection 100 Units  100 Units Other Once            ALLERGIES:   Allergies   Allergen Reactions    Quetiapine        SOCIAL HISTORY:  Social History     Tobacco Use    Smoking status: Heavy Smoker     Current packs/day: 0.50

## 2025-07-12 DIAGNOSIS — R61 HYPERHIDROSIS: ICD-10-CM

## 2025-07-14 RX ORDER — OXYBUTYNIN CHLORIDE 10 MG/1
10 TABLET, EXTENDED RELEASE ORAL DAILY
Qty: 30 TABLET | Refills: 3 | Status: SHIPPED | OUTPATIENT
Start: 2025-07-14

## 2025-08-22 ENCOUNTER — TELEPHONE (OUTPATIENT)
Age: 45
End: 2025-08-22

## 2025-09-02 ENCOUNTER — PROCEDURE VISIT (OUTPATIENT)
Age: 45
End: 2025-09-02
Payer: MEDICAID

## 2025-09-02 VITALS
DIASTOLIC BLOOD PRESSURE: 88 MMHG | SYSTOLIC BLOOD PRESSURE: 132 MMHG | BODY MASS INDEX: 50.61 KG/M2 | WEIGHT: 275 LBS | OXYGEN SATURATION: 98 % | HEART RATE: 77 BPM | HEIGHT: 62 IN

## 2025-09-02 DIAGNOSIS — R61 HYPERHIDROSIS: Primary | ICD-10-CM

## 2025-09-02 PROCEDURE — 64653 CHEMODENERV ECCRINE GLANDS: CPT | Performed by: DERMATOLOGY
